# Patient Record
Sex: FEMALE
[De-identification: names, ages, dates, MRNs, and addresses within clinical notes are randomized per-mention and may not be internally consistent; named-entity substitution may affect disease eponyms.]

---

## 2017-05-25 PROCEDURE — 86038 ANTINUCLEAR ANTIBODIES: CPT | Performed by: INTERNAL MEDICINE

## 2017-05-25 PROCEDURE — 86160 COMPLEMENT ANTIGEN: CPT | Performed by: INTERNAL MEDICINE

## 2017-05-25 PROCEDURE — 86225 DNA ANTIBODY NATIVE: CPT | Performed by: INTERNAL MEDICINE

## 2017-05-25 PROCEDURE — 86235 NUCLEAR ANTIGEN ANTIBODY: CPT | Performed by: INTERNAL MEDICINE

## 2017-05-25 PROCEDURE — 83516 IMMUNOASSAY NONANTIBODY: CPT | Performed by: INTERNAL MEDICINE

## 2017-05-25 PROCEDURE — 86200 CCP ANTIBODY: CPT | Performed by: INTERNAL MEDICINE

## 2017-05-30 ENCOUNTER — HOSPITAL (OUTPATIENT)
Dept: OTHER | Age: 62
End: 2017-05-30
Attending: HOSPITALIST

## 2017-05-30 LAB
ALBUMIN SERPL-MCNC: 3.5 GM/DL (ref 3.6–5.1)
ALBUMIN/GLOB SERPL: 1.2 {RATIO} (ref 1–2.4)
ALP SERPL-CCNC: 70 UNIT/L (ref 45–117)
ALT SERPL-CCNC: 25 UNIT/L
AMORPH SED URNS QL MICRO: ABNORMAL
ANALYZER ANC (IANC): NORMAL
ANION GAP SERPL CALC-SCNC: 12 MMOL/L (ref 10–20)
APPEARANCE UR: CLEAR
AST SERPL-CCNC: 20 UNIT/L
BACTERIA #/AREA URNS HPF: ABNORMAL /HPF
BASOPHILS # BLD: 0 THOUSAND/MCL (ref 0–0.3)
BASOPHILS NFR BLD: 1 %
BILIRUB SERPL-MCNC: 0.3 MG/DL (ref 0.2–1)
BILIRUB UR QL: NEGATIVE
BUN SERPL-MCNC: 10 MG/DL (ref 6–20)
BUN/CREAT SERPL: 14 (ref 7–25)
CALCIUM SERPL-MCNC: 8.7 MG/DL (ref 8.4–10.2)
CAOX CRY URNS QL MICRO: ABNORMAL
CHLORIDE: 107 MMOL/L (ref 98–107)
CO2 SERPL-SCNC: 27 MMOL/L (ref 21–32)
COLOR UR: ABNORMAL
CREAT SERPL-MCNC: 0.72 MG/DL (ref 0.51–0.95)
CRP SERPL-MCNC: 0.5 MG/DL
DIFFERENTIAL METHOD BLD: NORMAL
EOSINOPHIL # BLD: 0.1 THOUSAND/MCL (ref 0.1–0.5)
EOSINOPHIL NFR BLD: 1 %
EPITH CASTS #/AREA URNS LPF: ABNORMAL /[LPF]
ERYTHROCYTE [DISTWIDTH] IN BLOOD: 13.2 % (ref 11–15)
ERYTHROCYTE [SEDIMENTATION RATE] IN BLOOD BY WESTERGREN METHOD: 13 MM/HR (ref 0–20)
FATTY CASTS #/AREA URNS LPF: ABNORMAL /[LPF]
GLOBULIN SER-MCNC: 3 GM/DL (ref 2–4)
GLUCOSE BLDC GLUCOMTR-MCNC: 103 MG/DL (ref 65–99)
GLUCOSE SERPL-MCNC: 107 MG/DL (ref 65–99)
GLUCOSE UR-MCNC: NEGATIVE MG/DL
GRAN CASTS #/AREA URNS LPF: ABNORMAL /[LPF]
HEMATOCRIT: 39.6 % (ref 36–46.5)
HGB BLD-MCNC: 13.2 GM/DL (ref 12–15.5)
HGB UR QL: NEGATIVE
HYALINE CASTS #/AREA URNS LPF: ABNORMAL /LPF (ref 0–5)
INR PPP: 2.5
KETONES UR-MCNC: NEGATIVE MG/DL
LEUKOCYTE ESTERASE UR QL STRIP: NEGATIVE
LYMPHOCYTES # BLD: 1.1 THOUSAND/MCL (ref 1–4)
LYMPHOCYTES NFR BLD: 18 %
MAGNESIUM SERPL-MCNC: 1.9 MG/DL (ref 1.7–2.4)
MCH RBC QN AUTO: 29.8 PG (ref 26–34)
MCHC RBC AUTO-ENTMCNC: 33.3 GM/DL (ref 32–36.5)
MCV RBC AUTO: 89.4 FL (ref 78–100)
MIXED CELL CASTS #/AREA URNS LPF: ABNORMAL /[LPF]
MONOCYTES # BLD: 0.3 THOUSAND/MCL (ref 0.3–0.9)
MONOCYTES NFR BLD: 5 %
MUCOUS THREADS URNS QL MICRO: ABNORMAL
NEUTROPHILS # BLD: 4.7 THOUSAND/MCL (ref 1.8–7.7)
NEUTROPHILS NFR BLD: 75 %
NEUTS SEG NFR BLD: NORMAL %
NITRITE UR QL: NEGATIVE
PERCENT NRBC: NORMAL
PH UR: 7 UNIT (ref 5–7)
PLATELET # BLD: 269 THOUSAND/MCL (ref 140–450)
POTASSIUM SERPL-SCNC: 3.8 MMOL/L (ref 3.4–5.1)
PROT SERPL-MCNC: 6.5 GM/DL (ref 6.4–8.2)
PROT UR QL: NEGATIVE MG/DL
PROTHROMBIN TIME: 27.5 SECONDS (ref 9.7–11.8)
PROTHROMBIN TIME: ABNORMAL
RBC # BLD: 4.43 MILLION/MCL (ref 4–5.2)
RBC #/AREA URNS HPF: ABNORMAL /HPF (ref 0–3)
RBC CASTS #/AREA URNS LPF: ABNORMAL /[LPF]
RENAL EPI CELLS #/AREA URNS HPF: ABNORMAL /[HPF]
SODIUM SERPL-SCNC: 142 MMOL/L (ref 135–145)
SP GR UR: <1.005 (ref 1–1.03)
SPECIMEN SOURCE: ABNORMAL
SPERM URNS QL MICRO: ABNORMAL
SQUAMOUS #/AREA URNS HPF: ABNORMAL /HPF (ref 0–5)
T VAGINALIS URNS QL MICRO: ABNORMAL
TRI-PHOS CRY URNS QL MICRO: ABNORMAL
TROPONIN I SERPL HS-MCNC: <0.02 NG/ML
TSH SERPL-ACNC: 1.55 MCUNIT/ML (ref 0.35–5)
URATE CRY URNS QL MICRO: ABNORMAL
URINE REFLEX: ABNORMAL
URNS CMNT MICRO: ABNORMAL
UROBILINOGEN UR QL: 0.2 MG/DL (ref 0–1)
WAXY CASTS #/AREA URNS LPF: ABNORMAL /[LPF]
WBC # BLD: 6.2 THOUSAND/MCL (ref 4.2–11)
WBC #/AREA URNS HPF: ABNORMAL /HPF (ref 0–5)
WBC CASTS #/AREA URNS LPF: ABNORMAL /[LPF]
YEAST HYPHAE URNS QL MICRO: ABNORMAL
YEAST URNS QL MICRO: ABNORMAL

## 2017-05-31 LAB
CHOLEST SERPL-MCNC: 117 MG/DL
CHOLEST/HDLC SERPL: 1.6 {RATIO}
HDLC SERPL-MCNC: 71 MG/DL
INR PPP: 2.3
LDLC SERPL CALC-MCNC: 38 MG/DL
NONHDLC SERPL-MCNC: 46 MG/DL
PROTHROMBIN TIME: 25.2 SECONDS (ref 9.7–11.8)
PROTHROMBIN TIME: ABNORMAL
TRIGLYCERIDE (TRIGP): 38 MG/DL

## 2017-06-01 ENCOUNTER — DIAGNOSTIC TRANS (OUTPATIENT)
Dept: OTHER | Age: 62
End: 2017-06-01

## 2017-06-07 PROBLEM — I63.81 LACUNAR STROKE (HCC): Status: ACTIVE | Noted: 2017-06-07

## 2017-06-07 PROCEDURE — 86618 LYME DISEASE ANTIBODY: CPT | Performed by: INTERNAL MEDICINE

## 2017-06-07 PROCEDURE — 36415 COLL VENOUS BLD VENIPUNCTURE: CPT | Performed by: INTERNAL MEDICINE

## 2017-07-13 PROCEDURE — 86255 FLUORESCENT ANTIBODY SCREEN: CPT | Performed by: OTHER

## 2017-07-13 PROCEDURE — 83876 ASSAY MYELOPEROXIDASE: CPT | Performed by: OTHER

## 2017-07-13 PROCEDURE — 83516 IMMUNOASSAY NONANTIBODY: CPT | Performed by: OTHER

## 2017-07-13 PROCEDURE — 36415 COLL VENOUS BLD VENIPUNCTURE: CPT | Performed by: OTHER

## 2017-08-22 PROCEDURE — 85705 THROMBOPLASTIN INHIBITION: CPT | Performed by: INTERNAL MEDICINE

## 2017-08-22 PROCEDURE — 86146 BETA-2 GLYCOPROTEIN ANTIBODY: CPT | Performed by: INTERNAL MEDICINE

## 2017-08-22 PROCEDURE — 85613 RUSSELL VIPER VENOM DILUTED: CPT | Performed by: INTERNAL MEDICINE

## 2017-08-22 PROCEDURE — 36415 COLL VENOUS BLD VENIPUNCTURE: CPT | Performed by: INTERNAL MEDICINE

## 2017-08-22 PROCEDURE — 85670 THROMBIN TIME PLASMA: CPT | Performed by: INTERNAL MEDICINE

## 2017-08-22 PROCEDURE — 85732 THROMBOPLASTIN TIME PARTIAL: CPT | Performed by: INTERNAL MEDICINE

## 2017-08-22 PROCEDURE — 86147 CARDIOLIPIN ANTIBODY EA IG: CPT | Performed by: INTERNAL MEDICINE

## 2017-10-10 ENCOUNTER — OFFICE VISIT (OUTPATIENT)
Dept: UROLOGY | Facility: HOSPITAL | Age: 62
End: 2017-10-10
Attending: OBSTETRICS & GYNECOLOGY
Payer: COMMERCIAL

## 2017-10-10 VITALS
HEIGHT: 66 IN | BODY MASS INDEX: 36.16 KG/M2 | WEIGHT: 225 LBS | DIASTOLIC BLOOD PRESSURE: 70 MMHG | SYSTOLIC BLOOD PRESSURE: 110 MMHG

## 2017-10-10 DIAGNOSIS — N39.3 FEMALE STRESS INCONTINENCE: ICD-10-CM

## 2017-10-10 DIAGNOSIS — N95.2 POSTMENOPAUSAL ATROPHIC VAGINITIS: ICD-10-CM

## 2017-10-10 DIAGNOSIS — N81.6 RECTOCELE: Primary | ICD-10-CM

## 2017-10-10 DIAGNOSIS — N81.11 CYSTOCELE, MIDLINE: ICD-10-CM

## 2017-10-10 DIAGNOSIS — N81.84 PELVIC MUSCLE WASTING: ICD-10-CM

## 2017-10-10 DIAGNOSIS — N39.41 URGE INCONTINENCE: ICD-10-CM

## 2017-10-10 DIAGNOSIS — N90.4 VULVAR DYSTROPHY: ICD-10-CM

## 2017-10-10 PROCEDURE — 87086 URINE CULTURE/COLONY COUNT: CPT | Performed by: OBSTETRICS & GYNECOLOGY

## 2017-10-10 PROCEDURE — 99201 HC OUTPT EVAL AND MGNT NEW PT LEVEL 1: CPT

## 2017-10-10 PROCEDURE — 81002 URINALYSIS NONAUTO W/O SCOPE: CPT

## 2017-10-10 NOTE — PATIENT INSTRUCTIONS
[address] [address] PELVIC MEDICINE    BOWEL REGIMEN    Constipation can have detrimental effects on bladder function and can worsen the symptoms of prolapse. It is important to avoid constipation.     The first step for treating constipation is to i

## 2017-10-10 NOTE — PROGRESS NOTES
Hitesh Smart DO  10/10/2017     Referred by Dr. Piero Whitmore    Patient presents with:  Prolapse: Referred by Dr Piero Whitmore.  interested in pessary, +constipation cystocele and rectocele    Vag estrace weekly  Interested in pessary, tried #2 & #3 ring with Procedure: MODIFIED BLANCA;  Surgeon:                Anu Florez DPM;  Location: [facility] [address]  No date: HYSTERECTOMY  No date: KNEE REPLACEMENT SURGERY      Comment: bilateral  : OPEN REPAIR LUNATE DISLOCATION Left Standard drinks or equivalent: 1 per week     Comment: social/1 drink once a week.         Allergies:    Fosamax                 Hives    Medications:    Outpatient Medications Prior to Visit:  Sertraline HCl 50 MG Oral Tab Take 1 tablet (50 mg total) by m Summary:  Urogynecology Summary  Prolapse: Yes  SRAVANTHI: Yes  Urge Incontinence: Yes  Nocturia Frequency: 1  Frequency: 2 hours  Constipation: Yes  Wears pad day?: 1  Wears Pad Night?: 1  Activities are limited by UI/POP?: Yes    Review of Systems:    MIMI woodard pharmacologic treatments for OAB  Nonsurgical and surgical treatments for Stress Urinary Incontinence  Nonsurgical and surgical treatments for POP  Pelvic muscle rehabilitation including pelvic floor PT  Topical estrogen therapy for treating UGA  Bowel rou

## 2017-10-13 ENCOUNTER — TELEPHONE (OUTPATIENT)
Dept: UROLOGY | Facility: HOSPITAL | Age: 62
End: 2017-10-13

## 2017-10-19 ENCOUNTER — OFFICE VISIT (OUTPATIENT)
Dept: UROLOGY | Facility: HOSPITAL | Age: 62
End: 2017-10-19
Attending: OBSTETRICS & GYNECOLOGY
Payer: COMMERCIAL

## 2017-10-19 VITALS
DIASTOLIC BLOOD PRESSURE: 74 MMHG | WEIGHT: 225 LBS | HEIGHT: 66 IN | BODY MASS INDEX: 36.16 KG/M2 | SYSTOLIC BLOOD PRESSURE: 112 MMHG

## 2017-10-19 DIAGNOSIS — N39.3 FEMALE STRESS INCONTINENCE: ICD-10-CM

## 2017-10-19 DIAGNOSIS — N81.11 CYSTOCELE, MIDLINE: ICD-10-CM

## 2017-10-19 DIAGNOSIS — N39.41 URGE INCONTINENCE: ICD-10-CM

## 2017-10-19 DIAGNOSIS — N81.6 RECTOCELE: Primary | ICD-10-CM

## 2017-10-19 PROCEDURE — 51798 US URINE CAPACITY MEASURE: CPT

## 2017-10-19 PROCEDURE — 99211 OFF/OP EST MAY X REQ PHY/QHP: CPT

## 2017-10-19 RX ORDER — CLOBETASOL PROPIONATE 0.5 MG/G
1 CREAM TOPICAL 2 TIMES DAILY PRN
Qty: 1 TUBE | Refills: 3 | Status: SHIPPED | OUTPATIENT
Start: 2017-10-19

## 2017-10-19 NOTE — PROCEDURES
Patient here for pessary check, patient having leakage, denies vaginal spotting/discharge. Patient unhappy with pessary. Cube pessary removed,cleaned and put in a bag to take home.   Speculum exam revealed pink moist tissue, no open areas or lesions noted

## 2018-01-19 PROCEDURE — 86160 COMPLEMENT ANTIGEN: CPT | Performed by: INTERNAL MEDICINE

## 2018-02-21 ENCOUNTER — OFFICE VISIT (OUTPATIENT)
Dept: UROLOGY | Facility: HOSPITAL | Age: 63
End: 2018-02-21
Attending: OBSTETRICS & GYNECOLOGY
Payer: COMMERCIAL

## 2018-02-21 VITALS
HEIGHT: 67 IN | WEIGHT: 230 LBS | BODY MASS INDEX: 36.1 KG/M2 | DIASTOLIC BLOOD PRESSURE: 82 MMHG | SYSTOLIC BLOOD PRESSURE: 122 MMHG

## 2018-02-21 DIAGNOSIS — N39.3 FEMALE STRESS INCONTINENCE: ICD-10-CM

## 2018-02-21 DIAGNOSIS — N39.41 URGE INCONTINENCE: ICD-10-CM

## 2018-02-21 DIAGNOSIS — N81.11 CYSTOCELE, MIDLINE: ICD-10-CM

## 2018-02-21 DIAGNOSIS — N81.6 RECTOCELE: Primary | ICD-10-CM

## 2018-02-21 LAB
BLOOD URINE: NEGATIVE
CONTROL RUN WITHIN 24 HOURS?: YES
LEUKOCYTE ESTERASE URINE: NEGATIVE
NITRITE URINE: NEGATIVE

## 2018-02-21 PROCEDURE — 81002 URINALYSIS NONAUTO W/O SCOPE: CPT

## 2018-02-21 PROCEDURE — 51700 IRRIGATION OF BLADDER: CPT

## 2018-02-21 NOTE — PATIENT INSTRUCTIONS
[facility]  [address] [place] [address] 67, [address]su  Office: [phone]      Urodynamic Testing Discharge Instructions: There are NO dietary or activity restrictions. You may resume your normal schedule.       You may hav

## 2018-02-21 NOTE — PROGRESS NOTES
..Patient here for urodynamic testing. Procedure explained and confirmed by patient. See evaluation form for results. Both verbal and written discharge instructions were given.   Patient tolerated procedure well and will follow up with Dr. Prem Vasquez

## 2018-02-21 NOTE — PROCEDURES
URODYNAMIC EVALUATION  PATIENT HISTORY:  Pt with a H/O hyst, has tried a pessary in the past for her prolase without success--could not find one to fit properly, c/o leaking with activity and on the way to the bathroom  Surgery?   [x]  No  []  Yes, specify Erythrocyte Sedimentatio* 01/19/2018 8    • C-Reactive Protein 01/19/2018 0.27    • Complement C3 01/19/2018 126.0    • Complement C4 01/19/2018 23.5    • WBC 01/19/2018 5.54    • RBC 01/19/2018 4.20    • Hemoglobin 01/19/2018 13.0    • Hematocrit 01/19/20 450 mL  Bladder Compliance:   129 mL/cm water  Detrusor Activity:  []  Unstable   [x]  Stable  Urge leakage?     []  Yes [x]  No  Volume at 1st unhibited detrusor cont:   n/a mL  Detrusor instability provoked by:    []  Spontaneous []  Coughing  []  Weston Melgar

## 2018-02-27 ENCOUNTER — OFFICE VISIT (OUTPATIENT)
Dept: UROLOGY | Facility: HOSPITAL | Age: 63
End: 2018-02-27
Attending: OBSTETRICS & GYNECOLOGY
Payer: COMMERCIAL

## 2018-02-27 VITALS
SYSTOLIC BLOOD PRESSURE: 122 MMHG | DIASTOLIC BLOOD PRESSURE: 82 MMHG | HEIGHT: 67 IN | BODY MASS INDEX: 36.1 KG/M2 | WEIGHT: 230 LBS

## 2018-02-27 DIAGNOSIS — N39.41 URGE INCONTINENCE: ICD-10-CM

## 2018-02-27 DIAGNOSIS — N39.3 FEMALE STRESS INCONTINENCE: ICD-10-CM

## 2018-02-27 DIAGNOSIS — N81.6 RECTOCELE: ICD-10-CM

## 2018-02-27 DIAGNOSIS — N81.11 CYSTOCELE, MIDLINE: Primary | ICD-10-CM

## 2018-02-27 PROCEDURE — 99211 OFF/OP EST MAY X REQ PHY/QHP: CPT

## 2018-02-27 NOTE — PROGRESS NOTES
Pt presents w/ initial c/o bulge  Urodynamic testing undergone without complication.   Results reviewed with patient  224/30cc & 526/4cc  No DO  + SRAVANTHI @300cc reduced  skilled nursing 450cc    Discussed with patient mgmt options for POP, SRAVANTHI (constipation)  Tried multip

## 2018-02-27 NOTE — PATIENT INSTRUCTIONS
[id number] [address] PELVIC MEDICINE    BOWEL REGIMEN    Constipation can have detrimental effects on bladder function and can worsen the symptoms of prolapse. It is important to avoid constipation.     The first step for treating constipation is to i ride in a car immediately. · You may drive after 1-2 weeks.     Please call us for any of the following:  · Temperature above 100.5 for 4 hours or above 101.0 at any time  · Chest pain or trouble breathing  · Vaginal bleeding heavier than a period  · Redne

## 2018-03-14 ENCOUNTER — ANESTHESIA EVENT (OUTPATIENT)
Dept: SURGERY | Facility: HOSPITAL | Age: 63
End: 2018-03-14

## 2018-03-14 NOTE — H&P
56 y/o female with cystocele, rectocele, SRAVANTHI for surgical mgmt  Pre operative clearance with Dr John Montalvo, pt seen & examined without changes.   Thorough discussion of surgical risks, benefits, and alternatives including, but not limited to bleeding/clots, inf

## 2018-03-14 NOTE — ANESTHESIA PREPROCEDURE EVALUATION
PRE-OP EVALUATION    Patient Name: Toshia Llamas    Pre-op Diagnosis: CYSTOCELE, RECTOCELE, STRESS INCONTINENCE    Procedure(s):  ANTERIOR POSTERIOR REPAIR, PLACEMENT OF MIDURETHRAL SLING, CYSTOSCOPY    Surgeon(s) and Role:     * Toney Del Castillo DO - P total) by mouth daily with food. Disp: 90 tablet Rfl: 3   Clobetasol Propionate 0.05 % External Cream Apply 1 Application topically 2 (two) times daily as needed.  (Patient taking differently: Apply 1 Application topically twice a week.  ) Disp: 1 Tube Rfl: (posterior vitreous detachment), bilateral  Undifferentiated connective tissue disease ([address] DVT, recurrent, lower extremity, chronic, unspecified laterality ([address]  Other pulmonary embolism without acute cor pulmonale, unspecified chronicity ([address] S/P rota within  [facility] limits.   Mora Rangel MD, [facility]  02/17/2017 12:31    ECG: NSR, normal      Past Surgical History:  No date: COLONOSCOPY      Comment: 2005; hemorrhoids  8/8/2013: COLONOSCOPY      Comment: Procedure: COLONOSCOPY;  Surgeon: Dejah Snowden Smokeless tobacco: Never Used    Alcohol use Yes    Comment: social       Drug use: No     Available pre-op labs reviewed.     Lab Results  Component Value Date   WBC 5.54 01/19/2018   RBC 4.20 01/19/2018   HGB 13.0 01/19/2018   HCT 38.4 01/19/2018   MCV 91 spouse                Present on Admission:  **None**

## 2018-03-15 ENCOUNTER — SURGERY (OUTPATIENT)
Age: 63
End: 2018-03-15

## 2018-03-15 ENCOUNTER — ANESTHESIA (OUTPATIENT)
Dept: SURGERY | Facility: HOSPITAL | Age: 63
End: 2018-03-15

## 2018-03-15 ENCOUNTER — HOSPITAL ENCOUNTER (OUTPATIENT)
Facility: HOSPITAL | Age: 63
Setting detail: HOSPITAL OUTPATIENT SURGERY
Discharge: HOME | End: 2018-03-15
Attending: OBSTETRICS & GYNECOLOGY | Admitting: OBSTETRICS & GYNECOLOGY
Payer: COMMERCIAL

## 2018-03-15 VITALS
WEIGHT: 225.31 LBS | TEMPERATURE: 98 F | BODY MASS INDEX: 35.36 KG/M2 | DIASTOLIC BLOOD PRESSURE: 73 MMHG | OXYGEN SATURATION: 100 % | HEART RATE: 51 BPM | SYSTOLIC BLOOD PRESSURE: 118 MMHG | RESPIRATION RATE: 20 BRPM | HEIGHT: 67 IN

## 2018-03-15 PROCEDURE — 0JQC0ZZ REPAIR PELVIC REGION SUBCUTANEOUS TISSUE AND FASCIA, OPEN APPROACH: ICD-10-PCS | Performed by: OBSTETRICS & GYNECOLOGY

## 2018-03-15 PROCEDURE — 0TSD0ZZ REPOSITION URETHRA, OPEN APPROACH: ICD-10-PCS | Performed by: OBSTETRICS & GYNECOLOGY

## 2018-03-15 PROCEDURE — 0WQNXZZ REPAIR FEMALE PERINEUM, EXTERNAL APPROACH: ICD-10-PCS | Performed by: OBSTETRICS & GYNECOLOGY

## 2018-03-15 DEVICE — TRANSVAGINAL MID-URETHRAL SLING
Type: IMPLANTABLE DEVICE | Site: VAGINA | Status: FUNCTIONAL
Brand: ADVANTAGE FIT™  SYSTEM

## 2018-03-15 RX ORDER — NALOXONE HYDROCHLORIDE 0.4 MG/ML
80 INJECTION, SOLUTION INTRAMUSCULAR; INTRAVENOUS; SUBCUTANEOUS AS NEEDED
Status: DISCONTINUED | OUTPATIENT
Start: 2018-03-15 | End: 2018-03-15

## 2018-03-15 RX ORDER — HYDROCODONE BITARTRATE AND ACETAMINOPHEN 5; 325 MG/1; MG/1
1 TABLET ORAL EVERY 6 HOURS PRN
Status: DISCONTINUED | OUTPATIENT
Start: 2018-03-15 | End: 2018-03-15

## 2018-03-15 RX ORDER — ONDANSETRON 2 MG/ML
4 INJECTION INTRAMUSCULAR; INTRAVENOUS AS NEEDED
Status: DISCONTINUED | OUTPATIENT
Start: 2018-03-15 | End: 2018-03-15

## 2018-03-15 RX ORDER — HYDROCODONE BITARTRATE AND ACETAMINOPHEN 5; 325 MG/1; MG/1
1 TABLET ORAL EVERY 6 HOURS PRN
Qty: 12 TABLET | Refills: 0 | Status: SHIPPED | OUTPATIENT
Start: 2018-03-15 | End: 2018-03-30

## 2018-03-15 RX ORDER — ONDANSETRON 2 MG/ML
INJECTION INTRAMUSCULAR; INTRAVENOUS
Status: COMPLETED
Start: 2018-03-15 | End: 2018-03-15

## 2018-03-15 RX ORDER — HYDROCODONE BITARTRATE AND ACETAMINOPHEN 5; 325 MG/1; MG/1
2 TABLET ORAL AS NEEDED
Status: DISCONTINUED | OUTPATIENT
Start: 2018-03-15 | End: 2018-03-15

## 2018-03-15 RX ORDER — HYDROCODONE BITARTRATE AND ACETAMINOPHEN 5; 325 MG/1; MG/1
1 TABLET ORAL AS NEEDED
Status: DISCONTINUED | OUTPATIENT
Start: 2018-03-15 | End: 2018-03-15

## 2018-03-15 RX ORDER — CEFAZOLIN SODIUM 1 G/3ML
INJECTION, POWDER, FOR SOLUTION INTRAMUSCULAR; INTRAVENOUS
Status: DISCONTINUED | OUTPATIENT
Start: 2018-03-15 | End: 2018-03-15

## 2018-03-15 RX ORDER — CEFAZOLIN SODIUM/WATER 2 G/20 ML
SYRINGE (ML) INTRAVENOUS
Status: DISCONTINUED
Start: 2018-03-15 | End: 2018-03-15

## 2018-03-15 RX ORDER — METOCLOPRAMIDE HYDROCHLORIDE 5 MG/ML
10 INJECTION INTRAMUSCULAR; INTRAVENOUS AS NEEDED
Status: DISCONTINUED | OUTPATIENT
Start: 2018-03-15 | End: 2018-03-15

## 2018-03-15 RX ORDER — IBUPROFEN 200 MG
600 TABLET ORAL EVERY 6 HOURS PRN
Status: DISCONTINUED | OUTPATIENT
Start: 2018-03-15 | End: 2018-03-15

## 2018-03-15 RX ORDER — CEFAZOLIN SODIUM/WATER 2 G/20 ML
2 SYRINGE (ML) INTRAVENOUS ONCE
Status: DISCONTINUED | OUTPATIENT
Start: 2018-03-15 | End: 2018-03-15 | Stop reason: HOSPADM

## 2018-03-15 RX ORDER — MORPHINE SULFATE 2 MG/ML
2 INJECTION, SOLUTION INTRAMUSCULAR; INTRAVENOUS EVERY 5 MIN PRN
Status: DISCONTINUED | OUTPATIENT
Start: 2018-03-15 | End: 2018-03-15

## 2018-03-15 RX ORDER — MEPERIDINE HYDROCHLORIDE 25 MG/ML
12.5 INJECTION INTRAMUSCULAR; INTRAVENOUS; SUBCUTANEOUS AS NEEDED
Status: DISCONTINUED | OUTPATIENT
Start: 2018-03-15 | End: 2018-03-15

## 2018-03-15 RX ORDER — IBUPROFEN 600 MG/1
600 TABLET ORAL EVERY 6 HOURS PRN
Qty: 30 TABLET | Refills: 1 | Status: SHIPPED | OUTPATIENT
Start: 2018-03-15 | End: 2018-03-30

## 2018-03-15 RX ORDER — BUPIVACAINE HYDROCHLORIDE AND EPINEPHRINE 2.5; 5 MG/ML; UG/ML
INJECTION, SOLUTION EPIDURAL; INFILTRATION; INTRACAUDAL; PERINEURAL AS NEEDED
Status: DISCONTINUED | OUTPATIENT
Start: 2018-03-15 | End: 2018-03-15 | Stop reason: HOSPADM

## 2018-03-15 RX ORDER — SODIUM CHLORIDE, SODIUM LACTATE, POTASSIUM CHLORIDE, CALCIUM CHLORIDE 600; 310; 30; 20 MG/100ML; MG/100ML; MG/100ML; MG/100ML
INJECTION, SOLUTION INTRAVENOUS CONTINUOUS
Status: DISCONTINUED | OUTPATIENT
Start: 2018-03-15 | End: 2018-03-15

## 2018-03-15 NOTE — ANESTHESIA POSTPROCEDURE EVALUATION
[address] [facility] Patient Status:  Hospital Outpatient Surgery   Age/Gender 57year old female MRN [id number]   Location [facility] Attending Danilo Neal,    Hosp Day # 0 PCP Bernardo Kohler MD       A

## 2018-03-15 NOTE — OPERATIVE REPORT
Pre Op: cystocele, rectocele, stress urinary incontinence  Post op: same    Procedure: anterior repair, posterior colpoperineorrhaphy, midurethral sling, cystoscopy  Surgeon: Leopoldo Bernal DO  Assist: ROBERT Severino  EBL: 100cc  UOP [id number]   No complications  Fin sling were marked on the mons pubis, and the Advantage Fit trocars were then used to place the guide sleeves into the retropubic space.   Once the sleeves were in position, cystoscopy was performed confirming there had been no injury to the bladder with mckayla

## 2018-03-16 ENCOUNTER — TELEPHONE (OUTPATIENT)
Dept: UROLOGY | Facility: HOSPITAL | Age: 63
End: 2018-03-16

## 2018-03-16 NOTE — TELEPHONE ENCOUNTER
TC to pt following surgery  LM  Encouraged her to call with questions or concerns  Follow up as scheduled

## 2018-03-30 ENCOUNTER — OFFICE VISIT (OUTPATIENT)
Dept: UROLOGY | Facility: HOSPITAL | Age: 63
End: 2018-03-30
Attending: OBSTETRICS & GYNECOLOGY
Payer: COMMERCIAL

## 2018-03-30 VITALS
WEIGHT: 225 LBS | DIASTOLIC BLOOD PRESSURE: 84 MMHG | BODY MASS INDEX: 35.73 KG/M2 | HEIGHT: 66.5 IN | SYSTOLIC BLOOD PRESSURE: 128 MMHG

## 2018-03-30 DIAGNOSIS — Z98.890 POST-OPERATIVE STATE: Primary | ICD-10-CM

## 2018-03-30 PROCEDURE — 99211 OFF/OP EST MAY X REQ PHY/QHP: CPT

## 2018-03-30 NOTE — PROGRESS NOTES
She is s/p Post-Op Summary  Procedure Date: 03/15/18  Procedure Name: Anterior and Posterior Repair;Cystoscopy;Prolene Mesh Mid Urethral Sling  Post-Op Symptoms: Bleeding  Do you feel your surgery was successful?: Very successful  Compared to before surger

## 2018-04-12 ENCOUNTER — TELEPHONE (OUTPATIENT)
Dept: UROLOGY | Facility: HOSPITAL | Age: 63
End: 2018-04-12

## 2018-04-12 NOTE — TELEPHONE ENCOUNTER
Pt called with condition update, states fell at home on Tuesday, went to [facility], was admitted, wearing a cervical collar, had 18 stitches in her forehead, is taking Norco again, had increased constipation again, also has noticed she has had

## 2018-04-26 PROBLEM — S19.9XXA HEADACHE DUE TO INJURY OF HEAD AND NECK: Status: ACTIVE | Noted: 2018-04-26

## 2018-04-26 PROBLEM — M54.2 NECK PAIN, ACUTE: Status: ACTIVE | Noted: 2018-04-26

## 2018-04-26 PROBLEM — S09.90XA HEADACHE DUE TO INJURY OF HEAD AND NECK: Status: ACTIVE | Noted: 2018-04-26

## 2018-05-21 PROCEDURE — 87086 URINE CULTURE/COLONY COUNT: CPT | Performed by: INTERNAL MEDICINE

## 2018-05-21 PROCEDURE — 86160 COMPLEMENT ANTIGEN: CPT | Performed by: INTERNAL MEDICINE

## 2018-05-21 PROCEDURE — 86225 DNA ANTIBODY NATIVE: CPT | Performed by: INTERNAL MEDICINE

## 2018-05-21 PROCEDURE — 81001 URINALYSIS AUTO W/SCOPE: CPT | Performed by: INTERNAL MEDICINE

## 2018-06-12 ENCOUNTER — OFFICE VISIT (OUTPATIENT)
Dept: UROLOGY | Facility: HOSPITAL | Age: 63
End: 2018-06-12
Attending: OBSTETRICS & GYNECOLOGY
Payer: COMMERCIAL

## 2018-06-12 VITALS
WEIGHT: 231 LBS | SYSTOLIC BLOOD PRESSURE: 118 MMHG | HEIGHT: 67 IN | DIASTOLIC BLOOD PRESSURE: 82 MMHG | BODY MASS INDEX: 36.26 KG/M2

## 2018-06-12 DIAGNOSIS — N95.2 POSTMENOPAUSAL ATROPHIC VAGINITIS: ICD-10-CM

## 2018-06-12 DIAGNOSIS — Z98.890 POST-OPERATIVE STATE: ICD-10-CM

## 2018-06-12 DIAGNOSIS — N81.84 PELVIC MUSCLE WASTING: Primary | ICD-10-CM

## 2018-06-12 PROCEDURE — 99211 OFF/OP EST MAY X REQ PHY/QHP: CPT

## 2018-06-12 NOTE — PATIENT INSTRUCTIONS
Vaginal estrogen cream twice weekly    [address] MEDICINE    PELVIC MUSCLE EXERCISES [facility] [place])    The muscles that surround the vagina help to support the pelvic organs and maintain bladder and bowel control are called the “pelvic fl The goal is to maintain constant effort in an active squeeze in order to strengthen the muscles. It is better to maintain a strong active squeeze for a short period of time that to flick the muscle on and off for any period of time.   You will need to work

## 2018-06-12 NOTE — PROGRESS NOTES
She is s/p Post-Op Summary  Procedure Date: 03/15/18  Procedure Name: Anterior and Posterior Repair;Prolene Mesh Mid Urethral Sling;Cystoscopy  Post-Op Symptoms: Patient denies pain, SRAVANTHI, UUI, prolapse symptoms, nausea/vomitting, fevers/chills, bleeding, v

## 2018-08-29 PROBLEM — N90.4 LICHEN SCLEROSUS ET ATROPHICUS OF THE VULVA: Status: ACTIVE | Noted: 2018-08-29

## 2018-10-24 PROCEDURE — 83516 IMMUNOASSAY NONANTIBODY: CPT | Performed by: INTERNAL MEDICINE

## 2018-10-24 PROCEDURE — 86160 COMPLEMENT ANTIGEN: CPT | Performed by: INTERNAL MEDICINE

## 2018-10-24 PROCEDURE — 81001 URINALYSIS AUTO W/SCOPE: CPT | Performed by: INTERNAL MEDICINE

## 2018-10-24 PROCEDURE — 86225 DNA ANTIBODY NATIVE: CPT | Performed by: INTERNAL MEDICINE

## 2018-10-24 PROCEDURE — 86038 ANTINUCLEAR ANTIBODIES: CPT | Performed by: INTERNAL MEDICINE

## 2019-03-04 PROCEDURE — 86225 DNA ANTIBODY NATIVE: CPT | Performed by: INTERNAL MEDICINE

## 2019-03-04 PROCEDURE — 81001 URINALYSIS AUTO W/SCOPE: CPT | Performed by: INTERNAL MEDICINE

## 2019-03-04 PROCEDURE — 86160 COMPLEMENT ANTIGEN: CPT | Performed by: INTERNAL MEDICINE

## 2019-03-07 PROBLEM — G44.86 HEADACHE, CERVICOGENIC: Status: ACTIVE | Noted: 2019-03-07

## 2019-03-19 ENCOUNTER — OFFICE VISIT (OUTPATIENT)
Dept: UROLOGY | Facility: HOSPITAL | Age: 64
End: 2019-03-19
Attending: OBSTETRICS & GYNECOLOGY
Payer: MEDICARE

## 2019-03-19 VITALS
BODY MASS INDEX: 35.03 KG/M2 | DIASTOLIC BLOOD PRESSURE: 78 MMHG | WEIGHT: 218 LBS | SYSTOLIC BLOOD PRESSURE: 118 MMHG | HEIGHT: 66 IN

## 2019-03-19 DIAGNOSIS — Z98.890 POST-OPERATIVE STATE: ICD-10-CM

## 2019-03-19 DIAGNOSIS — N81.84 PELVIC MUSCLE WASTING: ICD-10-CM

## 2019-03-19 DIAGNOSIS — R15.9 INCONTINENCE OF FECES WITH FECAL URGENCY: ICD-10-CM

## 2019-03-19 DIAGNOSIS — R15.2 INCONTINENCE OF FECES WITH FECAL URGENCY: ICD-10-CM

## 2019-03-19 DIAGNOSIS — N95.2 POSTMENOPAUSAL ATROPHIC VAGINITIS: Primary | ICD-10-CM

## 2019-03-19 DIAGNOSIS — N90.4 VULVAR DYSTROPHY: ICD-10-CM

## 2019-03-19 PROCEDURE — 17250 CHEM CAUT OF GRANLTJ TISSUE: CPT

## 2019-03-19 PROCEDURE — 99211 OFF/OP EST MAY X REQ PHY/QHP: CPT

## 2019-03-19 RX ORDER — ESTRADIOL 0.1 MG/G
CREAM VAGINAL DAILY PRN
COMMUNITY

## 2019-03-19 NOTE — PATIENT INSTRUCTIONS
Vag estrogen twice weekly  [facility] FOR PELVIC MEDICINE    Fingertip Application Method for Estrogen Vaginal Cream    1. Wash you hands with soap and water and dry thoroughly.     2.  Squeeze out enough cream from the tube to _____Milk of Magnesia    Begin with 1 teaspoon every evening. This is a very low dose---approximately one-sixth of the typical dose. You may need to increase the amount depending on your results.       XX Miralax - as needed (but regularly)    Miralax is In order to determine if you are santa effectively, it is helpful to place a finger in the vagina intermittently in order to note your progress and determine if you are satna the correct muscles.   Although a proper Kegel contraction can stop th The benefits include improving pelvic floor strength and tone. This results in a better ability to hold your urine, your bowels and for those who are sexually active, there may be improvement in ability to feel pleasure.

## 2019-03-19 NOTE — PROGRESS NOTES
She is s/p Post-Op Summary  Procedure Date: 03/15/18  Procedure Name: Anterior and Posterior Repair;Prolene Mesh Mid Urethral Sling;Cystoscopy  Post-Op Symptoms: Prolapse symptoms;Bleeding  Do you feel your surgery was successful?: Somewhat successful  Com

## 2019-08-05 PROCEDURE — 86160 COMPLEMENT ANTIGEN: CPT | Performed by: INTERNAL MEDICINE

## 2019-08-27 PROCEDURE — 87086 URINE CULTURE/COLONY COUNT: CPT | Performed by: EMERGENCY MEDICINE

## 2019-08-27 PROCEDURE — 87186 SC STD MICRODIL/AGAR DIL: CPT | Performed by: EMERGENCY MEDICINE

## 2019-08-27 PROCEDURE — 87088 URINE BACTERIA CULTURE: CPT | Performed by: EMERGENCY MEDICINE

## 2019-12-31 ENCOUNTER — APPOINTMENT (OUTPATIENT)
Dept: CT IMAGING | Age: 64
End: 2019-12-31
Attending: EMERGENCY MEDICINE
Payer: MEDICARE

## 2019-12-31 ENCOUNTER — HOSPITAL ENCOUNTER (EMERGENCY)
Age: 64
Discharge: HOME | End: 2019-12-31
Attending: EMERGENCY MEDICINE
Payer: MEDICARE

## 2019-12-31 VITALS
WEIGHT: 206 LBS | HEART RATE: 53 BPM | SYSTOLIC BLOOD PRESSURE: 112 MMHG | RESPIRATION RATE: 16 BRPM | HEIGHT: 67 IN | DIASTOLIC BLOOD PRESSURE: 64 MMHG | BODY MASS INDEX: 32.33 KG/M2 | TEMPERATURE: 98 F | OXYGEN SATURATION: 97 %

## 2019-12-31 DIAGNOSIS — R09.1 PLEURISY: Primary | ICD-10-CM

## 2019-12-31 LAB
ALBUMIN SERPL-MCNC: 3.5 G/DL (ref 3.4–5)
ALBUMIN/GLOB SERPL: 1.2 {RATIO} (ref 1–2)
ALP LIVER SERPL-CCNC: 78 U/L (ref 55–142)
ALT SERPL-CCNC: 23 U/L (ref 13–56)
ANION GAP SERPL CALC-SCNC: 7 MMOL/L (ref 0–18)
AST SERPL-CCNC: 15 U/L (ref 15–37)
BASOPHILS # BLD AUTO: 0.06 X10(3) UL (ref 0–0.2)
BASOPHILS NFR BLD AUTO: 1.1 %
BILIRUB SERPL-MCNC: 0.4 MG/DL (ref 0.1–2)
BUN BLD-MCNC: 11 MG/DL (ref 7–18)
BUN/CREAT SERPL: 16.7 (ref 10–20)
CALCIUM BLD-MCNC: 8.3 MG/DL (ref 8.5–10.1)
CHLORIDE SERPL-SCNC: 107 MMOL/L (ref 98–112)
CO2 SERPL-SCNC: 25 MMOL/L (ref 21–32)
CREAT BLD-MCNC: 0.66 MG/DL (ref 0.55–1.02)
DEPRECATED RDW RBC AUTO: 43.6 FL (ref 35.1–46.3)
EOSINOPHIL # BLD AUTO: 0.22 X10(3) UL (ref 0–0.7)
EOSINOPHIL NFR BLD AUTO: 3.9 %
ERYTHROCYTE [DISTWIDTH] IN BLOOD BY AUTOMATED COUNT: 12.9 % (ref 11–15)
GLOBULIN PLAS-MCNC: 3 G/DL (ref 2.8–4.4)
GLUCOSE BLD-MCNC: 81 MG/DL (ref 70–99)
HCT VFR BLD AUTO: 38.9 % (ref 35–48)
HGB BLD-MCNC: 12.8 G/DL (ref 12–16)
IMM GRANULOCYTES # BLD AUTO: 0.02 X10(3) UL (ref 0–1)
IMM GRANULOCYTES NFR BLD: 0.4 %
LYMPHOCYTES # BLD AUTO: 1.74 X10(3) UL (ref 1–4)
LYMPHOCYTES NFR BLD AUTO: 30.7 %
M PROTEIN MFR SERPL ELPH: 6.5 G/DL (ref 6.4–8.2)
MCH RBC QN AUTO: 30.2 PG (ref 26–34)
MCHC RBC AUTO-ENTMCNC: 32.9 G/DL (ref 31–37)
MCV RBC AUTO: 91.7 FL (ref 80–100)
MONOCYTES # BLD AUTO: 0.39 X10(3) UL (ref 0.1–1)
MONOCYTES NFR BLD AUTO: 6.9 %
NEUTROPHILS # BLD AUTO: 3.24 X10 (3) UL (ref 1.5–7.7)
NEUTROPHILS # BLD AUTO: 3.24 X10(3) UL (ref 1.5–7.7)
NEUTROPHILS NFR BLD AUTO: 57 %
OSMOLALITY SERPL CALC.SUM OF ELEC: 286 MOSM/KG (ref 275–295)
PLATELET # BLD AUTO: 230 10(3)UL (ref 150–450)
POTASSIUM SERPL-SCNC: 4 MMOL/L (ref 3.5–5.1)
RBC # BLD AUTO: 4.24 X10(6)UL (ref 3.8–5.3)
SODIUM SERPL-SCNC: 139 MMOL/L (ref 136–145)
TROPONIN I SERPL-MCNC: <0.045 NG/ML (ref ?–0.04)
WBC # BLD AUTO: 5.7 X10(3) UL (ref 4–11)

## 2019-12-31 PROCEDURE — 93005 ELECTROCARDIOGRAM TRACING: CPT

## 2019-12-31 PROCEDURE — 80053 COMPREHEN METABOLIC PANEL: CPT | Performed by: EMERGENCY MEDICINE

## 2019-12-31 PROCEDURE — 85025 COMPLETE CBC W/AUTO DIFF WBC: CPT | Performed by: EMERGENCY MEDICINE

## 2019-12-31 PROCEDURE — 99285 EMERGENCY DEPT VISIT HI MDM: CPT

## 2019-12-31 PROCEDURE — 93010 ELECTROCARDIOGRAM REPORT: CPT

## 2019-12-31 PROCEDURE — 71275 CT ANGIOGRAPHY CHEST: CPT | Performed by: EMERGENCY MEDICINE

## 2019-12-31 PROCEDURE — 96374 THER/PROPH/DIAG INJ IV PUSH: CPT

## 2019-12-31 PROCEDURE — 84484 ASSAY OF TROPONIN QUANT: CPT | Performed by: EMERGENCY MEDICINE

## 2019-12-31 RX ORDER — KETOROLAC TROMETHAMINE 30 MG/ML
30 INJECTION, SOLUTION INTRAMUSCULAR; INTRAVENOUS ONCE
Status: COMPLETED | OUTPATIENT
Start: 2019-12-31 | End: 2019-12-31

## 2019-12-31 NOTE — ED INITIAL ASSESSMENT (HPI)
Right scapular pain x 3 days. Missed couple doses of xeralto. History of PE. Pain worse with deep breath.

## 2019-12-31 NOTE — ED PROVIDER NOTES
Patient Seen in: [facility] Emergency Department In [place]      History   Patient presents with:  Back Pain  Dyspnea DIPAK SOB    Stated Complaint: right scapular pain into rib cage, has hx of PE missed some doses of xarlto, ca*    HPI    64-year-old femal MAIN OR   • COLONOSCOPY      2005; hemorrhoids   • COLONOSCOPY N/A 8/8/2013    Performed by Hudson Alvarez MD at [facility] ENDOSCOPY   • ESOPHAGOGASTRODUODENOSCOPY, POSSIBLE BIOPSY, POSSIBLE POLYPECTOMY 87247 N/A 3/25/2015    Performed by Hudson Alvarez MD at [address] (36.4 °C)   Temp src    SpO2 96 %   O2 Device None (Room air)       Current:/64   Pulse 53   Temp 97.5 °F (36.4 °C)   Resp 16   Ht 170.2 cm (5' 7\")   Wt 93.4 kg   LMP  (LMP Unknown)   SpO2 97%   BMI 32.26 kg/m²         Physical Exam    General: Elmer to the [facility]) Johanny Arreaga [address] ([address]) which includes the Dose Index Registry.   PATIENT STATED HISTORY:(As transcribed by Technologist)  The patient states she is having right scapular pain and shortness of breat the setting of a viral URI and does not require antibiotics          Disposition and Plan     Clinical Impression:  Pleurisy  (primary encounter diagnosis)    Disposition:  There is no disposition on file for this visit.   There is no disposition time on fi

## 2020-01-02 LAB
ATRIAL RATE: 63 BPM
P AXIS: 63 DEGREES
P-R INTERVAL: 186 MS
Q-T INTERVAL: 446 MS
QRS DURATION: 90 MS
QTC CALCULATION (BEZET): 456 MS
R AXIS: -41 DEGREES
T AXIS: 27 DEGREES
VENTRICULAR RATE: 63 BPM

## 2020-02-12 PROBLEM — R42 VERTIGO: Status: ACTIVE | Noted: 2020-02-12

## 2020-05-19 ENCOUNTER — APPOINTMENT (OUTPATIENT)
Dept: MRI IMAGING | Facility: HOSPITAL | Age: 65
End: 2020-05-19
Attending: EMERGENCY MEDICINE
Payer: MEDICARE

## 2020-05-19 ENCOUNTER — HOSPITAL ENCOUNTER (EMERGENCY)
Facility: HOSPITAL | Age: 65
Discharge: HOME | End: 2020-05-19
Attending: EMERGENCY MEDICINE
Payer: MEDICARE

## 2020-05-19 VITALS
OXYGEN SATURATION: 97 % | BODY MASS INDEX: 34 KG/M2 | HEART RATE: 64 BPM | WEIGHT: 210.13 LBS | SYSTOLIC BLOOD PRESSURE: 121 MMHG | RESPIRATION RATE: 11 BRPM | TEMPERATURE: 99 F | DIASTOLIC BLOOD PRESSURE: 66 MMHG

## 2020-05-19 DIAGNOSIS — G45.9 TIA (TRANSIENT ISCHEMIC ATTACK): Primary | ICD-10-CM

## 2020-05-19 DIAGNOSIS — H81.10 BENIGN PAROXYSMAL POSITIONAL VERTIGO, UNSPECIFIED LATERALITY: ICD-10-CM

## 2020-05-19 PROCEDURE — 80053 COMPREHEN METABOLIC PANEL: CPT | Performed by: EMERGENCY MEDICINE

## 2020-05-19 PROCEDURE — 82962 GLUCOSE BLOOD TEST: CPT

## 2020-05-19 PROCEDURE — 85025 COMPLETE CBC W/AUTO DIFF WBC: CPT | Performed by: EMERGENCY MEDICINE

## 2020-05-19 PROCEDURE — 85730 THROMBOPLASTIN TIME PARTIAL: CPT | Performed by: EMERGENCY MEDICINE

## 2020-05-19 PROCEDURE — 99285 EMERGENCY DEPT VISIT HI MDM: CPT

## 2020-05-19 PROCEDURE — 70546 MR ANGIOGRAPH HEAD W/O&W/DYE: CPT | Performed by: EMERGENCY MEDICINE

## 2020-05-19 PROCEDURE — 36415 COLL VENOUS BLD VENIPUNCTURE: CPT

## 2020-05-19 PROCEDURE — 93010 ELECTROCARDIOGRAM REPORT: CPT

## 2020-05-19 PROCEDURE — A9575 INJ GADOTERATE MEGLUMI 0.1ML: HCPCS | Performed by: EMERGENCY MEDICINE

## 2020-05-19 PROCEDURE — 85610 PROTHROMBIN TIME: CPT | Performed by: EMERGENCY MEDICINE

## 2020-05-19 PROCEDURE — 70553 MRI BRAIN STEM W/O & W/DYE: CPT | Performed by: EMERGENCY MEDICINE

## 2020-05-19 PROCEDURE — 70549 MR ANGIOGRAPH NECK W/O&W/DYE: CPT | Performed by: EMERGENCY MEDICINE

## 2020-05-19 PROCEDURE — 93005 ELECTROCARDIOGRAM TRACING: CPT

## 2020-05-19 RX ORDER — MECLIZINE HYDROCHLORIDE 25 MG/1
25 TABLET ORAL 3 TIMES DAILY PRN
Qty: 20 TABLET | Refills: 0 | Status: SHIPPED | OUTPATIENT
Start: 2020-05-19 | End: 2020-07-13 | Stop reason: ALTCHOICE

## 2020-05-19 NOTE — ED PROVIDER NOTES
Patient Seen in: [facility] Emergency Department      History   Patient presents with:  Stroke  Dizziness    Stated Complaint: Dizziness, difficulty finding words     HPI    Patient is a 43-year-old female comes in emergency room for multiple neurol 2005; hemorrhoids   • COLONOSCOPY N/A 8/8/2013    Performed by Elisha Lee MD at [facility] ENDOSCOPY   • ESOPHAGOGASTRODUODENOSCOPY, POSSIBLE BIOPSY, POSSIBLE POLYPECTOMY 19880 N/A 3/25/2015    Performed by Elisha Lee MD at [facility] Other systems are as noted in HPI. Constitutional and vital signs reviewed. All other systems reviewed and negative except as noted above.     Physical Exam     ED Triage Vitals [05/19/20 0956]   /68   Pulse 69   Resp 12   Temp 98.5 °F (36.9 ° Labs Reviewed   COMP METABOLIC PANEL (14) - Abnormal; Notable for the following components:       Result Value    Glucose 107 (*)     All other components within normal limits   PROTHROMBIN TIME (PT) - Abnormal; Notable for the following components: criteria. PATIENT STATED HISTORY:(As transcribed by Technologist)  The patient has been having trouble finding words for 2-3 days and felt dizzy last night and this morning.  The patient fell about 4 days ago and has history of CVA in 2016 and without defi criteria. The cervical internal carotid arteries are widely patent. The vertebral arteries originate from the subclavian arteries. The origins of the vertebral arteries are patent. The cervical vertebral arteries are widely patent.   The left vertebral discharge and follow-up instructions were provided to help prevent relapse or worsening.   Patient was instructed to follow-up with her primary care provider for further evaluation and treatment, but to return immediately to the ER for worsening, concerning

## 2020-05-19 NOTE — ED INITIAL ASSESSMENT (HPI)
Patient has been having trouble finding words for 2-3 days. Felt dizzy last night and this morning. Fell 4 days ago due to a trip and injured her knee, but did not hit her head. History of CVA in 2016 with no deficits. YONATHAN Malone called at 0949 per MD

## 2020-05-19 NOTE — ED NOTES
MD at bedside. Pt with symptoms reported for past 2-3 days. Pt awake and alert, seated over side of cart. Pt appears comfortable, speech clear, pt following all commands appropriately.  Decision made by MD due to time pt is not a code stroke and will procee

## 2020-05-21 ENCOUNTER — TELEPHONE (OUTPATIENT)
Dept: NEUROLOGY | Facility: CLINIC | Age: 65
End: 2020-05-21

## 2020-05-21 NOTE — TELEPHONE ENCOUNTER
TIA CLINIC SCREENING    1. Date of ED visit/TIA diagnosis:  5/19/2020   Time of discharge from ED:  1320    2. Is patient currently admitted? No   If YES - TIA Clinic Appointment not required.       3. Does patient already see an AB[facility] neurologist?  No  Name

## 2020-07-07 ENCOUNTER — HOSPITAL ENCOUNTER (OUTPATIENT)
Dept: CV DIAGNOSTICS | Facility: HOSPITAL | Age: 65
Discharge: HOME | End: 2020-07-07
Attending: INTERNAL MEDICINE
Payer: MEDICARE

## 2020-07-07 DIAGNOSIS — G45.9 TIA (TRANSIENT ISCHEMIC ATTACK): ICD-10-CM

## 2020-07-07 PROCEDURE — 93306 TTE W/DOPPLER COMPLETE: CPT | Performed by: INTERNAL MEDICINE

## 2021-01-06 PROBLEM — M54.2 POSTERIOR NECK PAIN: Status: ACTIVE | Noted: 2018-04-26

## 2021-01-06 PROBLEM — M47.812 CERVICAL SPONDYLOSIS WITHOUT MYELOPATHY: Status: ACTIVE | Noted: 2021-01-06

## 2021-06-11 PROBLEM — D68.59 HYPERCOAGULABLE STATE (HCC): Status: ACTIVE | Noted: 2021-06-11

## 2021-06-11 PROBLEM — Z86.711 HISTORY OF PULMONARY EMBOLISM: Status: ACTIVE | Noted: 2021-06-11

## 2021-10-06 ENCOUNTER — APPOINTMENT (OUTPATIENT)
Dept: CT IMAGING | Age: 66
End: 2021-10-06
Attending: EMERGENCY MEDICINE
Payer: MEDICARE

## 2021-10-06 ENCOUNTER — HOSPITAL ENCOUNTER (EMERGENCY)
Age: 66
Discharge: HOME | End: 2021-10-06
Attending: EMERGENCY MEDICINE
Payer: MEDICARE

## 2021-10-06 VITALS
SYSTOLIC BLOOD PRESSURE: 141 MMHG | HEIGHT: 67 IN | WEIGHT: 220 LBS | TEMPERATURE: 98 F | DIASTOLIC BLOOD PRESSURE: 40 MMHG | BODY MASS INDEX: 34.53 KG/M2 | RESPIRATION RATE: 18 BRPM | HEART RATE: 50 BPM | OXYGEN SATURATION: 96 %

## 2021-10-06 DIAGNOSIS — K57.32 SIGMOID DIVERTICULITIS: Primary | ICD-10-CM

## 2021-10-06 PROCEDURE — 99284 EMERGENCY DEPT VISIT MOD MDM: CPT

## 2021-10-06 PROCEDURE — S0030 INJECTION, METRONIDAZOLE: HCPCS | Performed by: EMERGENCY MEDICINE

## 2021-10-06 PROCEDURE — 83690 ASSAY OF LIPASE: CPT | Performed by: EMERGENCY MEDICINE

## 2021-10-06 PROCEDURE — 96375 TX/PRO/DX INJ NEW DRUG ADDON: CPT

## 2021-10-06 PROCEDURE — 81001 URINALYSIS AUTO W/SCOPE: CPT | Performed by: EMERGENCY MEDICINE

## 2021-10-06 PROCEDURE — 87086 URINE CULTURE/COLONY COUNT: CPT | Performed by: EMERGENCY MEDICINE

## 2021-10-06 PROCEDURE — 96361 HYDRATE IV INFUSION ADD-ON: CPT

## 2021-10-06 PROCEDURE — 85025 COMPLETE CBC W/AUTO DIFF WBC: CPT | Performed by: EMERGENCY MEDICINE

## 2021-10-06 PROCEDURE — 99285 EMERGENCY DEPT VISIT HI MDM: CPT

## 2021-10-06 PROCEDURE — 96367 TX/PROPH/DG ADDL SEQ IV INF: CPT

## 2021-10-06 PROCEDURE — 74177 CT ABD & PELVIS W/CONTRAST: CPT | Performed by: EMERGENCY MEDICINE

## 2021-10-06 PROCEDURE — 96365 THER/PROPH/DIAG IV INF INIT: CPT

## 2021-10-06 PROCEDURE — 80053 COMPREHEN METABOLIC PANEL: CPT | Performed by: EMERGENCY MEDICINE

## 2021-10-06 RX ORDER — LEVOFLOXACIN 5 MG/ML
500 INJECTION, SOLUTION INTRAVENOUS ONCE
Status: COMPLETED | OUTPATIENT
Start: 2021-10-06 | End: 2021-10-06

## 2021-10-06 RX ORDER — METRONIDAZOLE 500 MG/1
500 TABLET ORAL 3 TIMES DAILY
Qty: 30 TABLET | Refills: 0 | Status: SHIPPED | OUTPATIENT
Start: 2021-10-06 | End: 2021-10-16

## 2021-10-06 RX ORDER — LEVOFLOXACIN 500 MG/1
500 TABLET, FILM COATED ORAL DAILY
Qty: 10 TABLET | Refills: 0 | Status: SHIPPED | OUTPATIENT
Start: 2021-10-07 | End: 2021-10-17

## 2021-10-06 RX ORDER — ONDANSETRON 2 MG/ML
4 INJECTION INTRAMUSCULAR; INTRAVENOUS
Status: DISCONTINUED | OUTPATIENT
Start: 2021-10-06 | End: 2021-10-06

## 2021-10-06 RX ORDER — ONDANSETRON 8 MG/1
8 TABLET, ORALLY DISINTEGRATING ORAL EVERY 6 HOURS PRN
Qty: 10 TABLET | Refills: 0 | Status: SHIPPED | OUTPATIENT
Start: 2021-10-06 | End: 2022-01-18 | Stop reason: ALTCHOICE

## 2021-10-06 RX ORDER — KETOROLAC TROMETHAMINE 30 MG/ML
30 INJECTION, SOLUTION INTRAMUSCULAR; INTRAVENOUS ONCE
Status: COMPLETED | OUTPATIENT
Start: 2021-10-06 | End: 2021-10-06

## 2021-10-06 RX ORDER — METRONIDAZOLE 500 MG/100ML
500 INJECTION, SOLUTION INTRAVENOUS ONCE
Status: COMPLETED | OUTPATIENT
Start: 2021-10-06 | End: 2021-10-06

## 2021-10-06 RX ORDER — SODIUM CHLORIDE 9 MG/ML
INJECTION, SOLUTION INTRAVENOUS CONTINUOUS
Status: DISCONTINUED | OUTPATIENT
Start: 2021-10-06 | End: 2021-10-06

## 2021-10-06 NOTE — ED PROVIDER NOTES
Patient Seen in: THE [facility] Emergency Department In [place]      History   Patient presents with:  Abdomen/Flank Pain    Stated Complaint: LLQ pain x 1 week, chills     Subjective:   HPI    Patient complains of lower abdominal pain and gestures over the l Procedure: COLONOSCOPY;  Surgeon: Jerad Lee MD;  Location: [facility] ENDOSCOPY   • EXCIS VAGINAL CYST/TUMOR      2004   • FOOT/TOES SURGERY PROC UNLISTED  2004    eli Fx (R)   • FOOT/TOES SURGERY PROC UNLISTED  20 years ago    excision of neuroma 1st IS (R) °F (36.6 °C)   Temp src    SpO2 96 %   O2 Device None (Room air)       Current:/48   Pulse 63   Temp 97.9 °F (36.6 °C)   Resp 18   Ht 170.2 cm (5' 7\")   Wt 99.8 kg   LMP  (LMP Unknown)   SpO2 99%   BMI 34.46 kg/m²         Physical Exam  General: The W/ DIFFERENTIAL[[id number]]                              Final result                 Please view results for these tests on the individual orders.    URINE CULTURE, ROUTINE   CBC W/ DIFFERENTIAL                   MDM      Patient complains of lower abdomina (three) times daily for 10 days. Qty: 30 tablet Refills: 0    ondansetron 8 MG Oral Tablet Dispersible  Take 1 tablet (8 mg total) by mouth every 6 (six) hours as needed for Nausea.   Qty: 10 tablet Refills: 0

## 2021-10-08 ENCOUNTER — HOSPITAL ENCOUNTER (EMERGENCY)
Facility: HOSPITAL | Age: 66
Discharge: HOME | End: 2021-10-08
Attending: EMERGENCY MEDICINE
Payer: MEDICARE

## 2021-10-08 VITALS
RESPIRATION RATE: 20 BRPM | DIASTOLIC BLOOD PRESSURE: 74 MMHG | OXYGEN SATURATION: 95 % | SYSTOLIC BLOOD PRESSURE: 132 MMHG | TEMPERATURE: 98 F | HEART RATE: 56 BPM

## 2021-10-08 DIAGNOSIS — K57.92 ACUTE DIVERTICULITIS: ICD-10-CM

## 2021-10-08 DIAGNOSIS — K59.00 CONSTIPATION, UNSPECIFIED CONSTIPATION TYPE: Primary | ICD-10-CM

## 2021-10-08 PROCEDURE — 99284 EMERGENCY DEPT VISIT MOD MDM: CPT

## 2021-10-08 PROCEDURE — 81003 URINALYSIS AUTO W/O SCOPE: CPT | Performed by: EMERGENCY MEDICINE

## 2021-10-08 PROCEDURE — 85025 COMPLETE CBC W/AUTO DIFF WBC: CPT | Performed by: EMERGENCY MEDICINE

## 2021-10-08 PROCEDURE — 36415 COLL VENOUS BLD VENIPUNCTURE: CPT

## 2021-10-08 PROCEDURE — 80053 COMPREHEN METABOLIC PANEL: CPT | Performed by: EMERGENCY MEDICINE

## 2021-10-08 RX ORDER — KETOROLAC TROMETHAMINE 30 MG/ML
15 INJECTION, SOLUTION INTRAMUSCULAR; INTRAVENOUS ONCE
Status: COMPLETED | OUTPATIENT
Start: 2021-10-08 | End: 2021-10-08

## 2021-10-08 NOTE — ED INITIAL ASSESSMENT (HPI)
Diagnosed 2 days ago with diverticulitis. Has been on levaquin and flagyl since then, is reporting severe constipation and minimal relief from pain, along with intermittent fever, chills, and nausea. LBM 12 days ago.

## 2021-10-08 NOTE — ED PROVIDER NOTES
Patient Seen in: [facility] Emergency Department      History   Patient presents with:  Abdomen/Flank Pain    Stated Complaint: abdominal pain    Subjective:   HPI    This is a 49-year-old female past medical history of coronary disease, DVT diagnos hemorrhoids   • COLONOSCOPY  8/8/2013    Procedure: COLONOSCOPY;  Surgeon: Jonny Luciano MD;  Location: [address] [place] [address] ENDOSCOPY   • EXCIS VAGINAL CYST/TUMOR      2004   • FOOT/TOES SURGERY PROC UNLISTED  2004    eli Pinon (R)   • FOOT/TOES SURGERY PROC UNLISTED  20 year Pulse 67   Resp 20   Temp 98.2 °F (36.8 °C)   Temp src    SpO2 97 %   O2 Device None (Room air)       Current:/74   Pulse 56   Temp 98.2 °F (36.8 °C)   Resp 20   LMP  (LMP Unknown)   SpO2 95%         Physical Exam    GENERAL: Awake, alert oriented days.  Ibuprofen or Tylenol for pain. Return if worse. Follow-up with them on Monday. Patient discharged home in good condition.             Disposition and Plan     Clinical Impression:  Constipation, unspecified constipation type  (primary encounter di

## 2021-11-02 PROBLEM — L40.9 PSORIASIS: Status: ACTIVE | Noted: 2021-11-02

## 2021-11-02 PROBLEM — L20.9 ATOPIC DERMATITIS AND RELATED CONDITION: Status: ACTIVE | Noted: 2021-08-29

## 2021-11-02 PROBLEM — R06.02 SHORTNESS OF BREATH: Status: ACTIVE | Noted: 2021-11-02

## 2021-11-02 PROBLEM — R00.2 PALPITATIONS: Status: ACTIVE | Noted: 2021-11-02

## 2021-11-02 PROBLEM — I45.9 CARDIAC CONDUCTION DISORDER: Status: ACTIVE | Noted: 2021-11-02

## 2021-11-02 PROBLEM — M32.9 SYSTEMIC LUPUS ERYTHEMATOSUS (HCC): Status: ACTIVE | Noted: 2021-11-02

## 2021-11-02 PROBLEM — H26.9 CATARACT: Status: ACTIVE | Noted: 2021-11-02

## 2021-11-02 PROBLEM — R07.9 CHEST PAIN: Status: ACTIVE | Noted: 2021-11-02

## 2021-11-02 PROBLEM — F41.9 ANXIETY: Status: ACTIVE | Noted: 2021-11-02

## 2021-11-02 PROBLEM — R42 VERTIGO: Status: RESOLVED | Noted: 2020-02-12 | Resolved: 2021-11-02

## 2022-01-04 ENCOUNTER — ANESTHESIA EVENT (OUTPATIENT)
Dept: ENDOSCOPY | Facility: HOSPITAL | Age: 67
End: 2022-01-04
Payer: MEDICARE

## 2022-01-04 NOTE — PAT NURSING NOTE
Chart sent to anesthesia for review due to +COVID on 12/20/21. Order received from Dr. Carla Myrick to notify Dr. Geneva Boone office of the  COVID 19 Surgery Scheduling algorithm .   Telephoned Dr. Brandy Glynn office and left a voice mail message for [facility] infor

## 2022-01-07 ENCOUNTER — ANESTHESIA (OUTPATIENT)
Dept: ENDOSCOPY | Facility: HOSPITAL | Age: 67
End: 2022-01-07
Payer: MEDICARE

## 2022-01-18 PROBLEM — I47.10 PSVT (PAROXYSMAL SUPRAVENTRICULAR TACHYCARDIA) (HCC): Status: ACTIVE | Noted: 2022-01-18

## 2022-01-18 PROBLEM — I47.1 PSVT (PAROXYSMAL SUPRAVENTRICULAR TACHYCARDIA) (HCC): Status: ACTIVE | Noted: 2022-01-18

## 2022-01-21 ENCOUNTER — HOSPITAL ENCOUNTER (OUTPATIENT)
Facility: HOSPITAL | Age: 67
Setting detail: HOSPITAL OUTPATIENT SURGERY
Discharge: HOME | End: 2022-01-21
Attending: INTERNAL MEDICINE | Admitting: INTERNAL MEDICINE
Payer: MEDICARE

## 2022-01-21 VITALS
BODY MASS INDEX: 35.36 KG/M2 | OXYGEN SATURATION: 97 % | HEIGHT: 66 IN | TEMPERATURE: 98 F | HEART RATE: 67 BPM | WEIGHT: 220 LBS | RESPIRATION RATE: 18 BRPM | DIASTOLIC BLOOD PRESSURE: 93 MMHG | SYSTOLIC BLOOD PRESSURE: 125 MMHG

## 2022-01-21 DIAGNOSIS — Z12.11 COLON CANCER SCREENING: ICD-10-CM

## 2022-01-21 LAB — GLUCOSE BLD-MCNC: 94 MG/DL (ref 70–99)

## 2022-01-21 PROCEDURE — 82962 GLUCOSE BLOOD TEST: CPT

## 2022-01-21 PROCEDURE — 0DJD8ZZ INSPECTION OF LOWER INTESTINAL TRACT, VIA NATURAL OR ARTIFICIAL OPENING ENDOSCOPIC: ICD-10-PCS | Performed by: INTERNAL MEDICINE

## 2022-01-21 RX ORDER — SODIUM CHLORIDE, SODIUM LACTATE, POTASSIUM CHLORIDE, CALCIUM CHLORIDE 600; 310; 30; 20 MG/100ML; MG/100ML; MG/100ML; MG/100ML
INJECTION, SOLUTION INTRAVENOUS CONTINUOUS
Status: DISCONTINUED | OUTPATIENT
Start: 2022-01-21 | End: 2022-01-21

## 2022-01-21 RX ORDER — METOCLOPRAMIDE HYDROCHLORIDE 5 MG/ML
10 INJECTION INTRAMUSCULAR; INTRAVENOUS AS NEEDED
Status: DISCONTINUED | OUTPATIENT
Start: 2022-01-21 | End: 2022-01-21

## 2022-01-21 RX ORDER — LIDOCAINE HYDROCHLORIDE 10 MG/ML
INJECTION, SOLUTION EPIDURAL; INFILTRATION; INTRACAUDAL; PERINEURAL AS NEEDED
Status: DISCONTINUED | OUTPATIENT
Start: 2022-01-21 | End: 2022-01-21 | Stop reason: SURG

## 2022-01-21 RX ORDER — NALOXONE HYDROCHLORIDE 0.4 MG/ML
80 INJECTION, SOLUTION INTRAMUSCULAR; INTRAVENOUS; SUBCUTANEOUS AS NEEDED
Status: DISCONTINUED | OUTPATIENT
Start: 2022-01-21 | End: 2022-01-21

## 2022-01-21 RX ORDER — DEXTROSE MONOHYDRATE 25 G/50ML
50 INJECTION, SOLUTION INTRAVENOUS
Status: DISCONTINUED | OUTPATIENT
Start: 2022-01-21 | End: 2022-01-21

## 2022-01-21 RX ORDER — DIPHENHYDRAMINE HYDROCHLORIDE 50 MG/ML
12.5 INJECTION INTRAMUSCULAR; INTRAVENOUS AS NEEDED
Status: DISCONTINUED | OUTPATIENT
Start: 2022-01-21 | End: 2022-01-21

## 2022-01-21 RX ORDER — ONDANSETRON 2 MG/ML
4 INJECTION INTRAMUSCULAR; INTRAVENOUS AS NEEDED
Status: DISCONTINUED | OUTPATIENT
Start: 2022-01-21 | End: 2022-01-21

## 2022-01-21 RX ORDER — HYDROMORPHONE HYDROCHLORIDE 1 MG/ML
0.4 INJECTION, SOLUTION INTRAMUSCULAR; INTRAVENOUS; SUBCUTANEOUS EVERY 5 MIN PRN
Status: DISCONTINUED | OUTPATIENT
Start: 2022-01-21 | End: 2022-01-21

## 2022-01-21 RX ADMIN — LIDOCAINE HYDROCHLORIDE 50 MG: 10 INJECTION, SOLUTION EPIDURAL; INFILTRATION; INTRACAUDAL; PERINEURAL at 10:39:00

## 2022-01-21 NOTE — OPERATIVE REPORT
[address] Patient Status:  [facility] Outpatient Surgery    1955 MRN [id number]   Location [address] [facility] 28 Attending Tanya Britt, [facility] Day # 0 PCP Jana Valdes MD       PREOPERATIVE DIAGNOSIS/INDICAT pattern were normal.  Transverse colon: The mucosa and vascular pattern were normal.  Descending colon: The mucosa and vascular pattern were normal.  Sigmoid colon: Diverticulosis. Rectum:  The mucosa and vascular pattern were normal. Retroflexion reveale

## 2022-01-21 NOTE — ANESTHESIA POSTPROCEDURE EVALUATION
[place] [address] Patient Status:  Hospital Outpatient Surgery   Age/Gender 74year old female MRN [id number]   Location [address] [place] [address] Attending Gayla Chou, [address] [facility] Day # 0 PCP MD Dick Jackman

## 2022-01-21 NOTE — ANESTHESIA PREPROCEDURE EVALUATION
PRE-OP EVALUATION    Patient Name: Krunal Kenney [facility]    Admit Diagnosis: Colon cancer screening [Z12.11]    Pre-op Diagnosis: Colon cancer screening [Z12.11]    COLONOSCOPY    Anesthesia Procedure: COLONOSCOPY (N/A )    Surgeon(s) and Role:     * Scott • SKIN SURGERY      squamous cell chest  2008   • SLING OPER STRES INCONTINENCE  2018   • TOTAL ABDOM HYSTERECTOMY     • TUBAL LIGATION     • UPPER GI ENDOSCOPY - REFERRAL      REFLUX ESOPHAGITIS, NON-OBS SCHATZKI'S RING, GASTRITIS   • UPPER GI ENDOSCOPY patient                Present on Admission:  **None**

## 2022-01-27 NOTE — H&P
History & Physical Examination    Patient Name: Laurie Garvey  MRN: [id number]  CSN: [id number]  YOB: 1955    Diagnosis: history of acute diverticulitis    Present Illness: 73 y/o F history as above presents for Colonoscopy.      No medicat or localized, unspecified site    • Pain in joints    • Painful urination    • PULMONARY EMBOLISM    • Rash    • Reflux    • Retinal tear    • Shortness of breath    • Skin blushing/flushing    • Sleep disturbance    • Squamous cell skin cancer    • Stool Procedure: ESOPHAGOGASTRODUODENOSCOPY, POSSIBLE BIOPSY, POSSIBLE POLYPECTOMY 04518;  Surgeon: Catrina Saba MD;  Location: [address] [place] [address]   • UPPER GI ENDOSCOPY,BIOPSY N/A 3/25/2015    Procedure: ESOPHAGOGASTRODUODENOSCOPY, POSSIBLE BIOPSY, POSSIBL

## 2022-02-15 ENCOUNTER — ANESTHESIA EVENT (OUTPATIENT)
Dept: SURGERY | Facility: HOSPITAL | Age: 67
DRG: 473 | End: 2022-02-15
Payer: MEDICARE

## 2022-02-16 ENCOUNTER — ANESTHESIA (OUTPATIENT)
Dept: SURGERY | Facility: HOSPITAL | Age: 67
DRG: 473 | End: 2022-02-16
Payer: MEDICARE

## 2022-02-16 ENCOUNTER — HOSPITAL ENCOUNTER (INPATIENT)
Facility: HOSPITAL | Age: 67
LOS: 1 days | Discharge: HOME | DRG: 473 | End: 2022-02-17
Attending: ORTHOPAEDIC SURGERY | Admitting: ORTHOPAEDIC SURGERY
Payer: MEDICARE

## 2022-02-16 ENCOUNTER — APPOINTMENT (OUTPATIENT)
Dept: GENERAL RADIOLOGY | Facility: HOSPITAL | Age: 67
DRG: 473 | End: 2022-02-16
Attending: ORTHOPAEDIC SURGERY
Payer: MEDICARE

## 2022-02-16 DIAGNOSIS — M43.12 SPONDYLOLISTHESIS OF CERVICAL REGION: ICD-10-CM

## 2022-02-16 LAB
ANTIBODY SCREEN: NEGATIVE
RH BLOOD TYPE: POSITIVE

## 2022-02-16 PROCEDURE — 4A11X4G MONITORING OF PERIPHERAL NERVOUS ELECTRICAL ACTIVITY, INTRAOPERATIVE, EXTERNAL APPROACH: ICD-10-PCS | Performed by: ORTHOPAEDIC SURGERY

## 2022-02-16 PROCEDURE — 86900 BLOOD TYPING SEROLOGIC ABO: CPT | Performed by: ORTHOPAEDIC SURGERY

## 2022-02-16 PROCEDURE — 5A09357 ASSISTANCE WITH RESPIRATORY VENTILATION, LESS THAN 24 CONSECUTIVE HOURS, CONTINUOUS POSITIVE AIRWAY PRESSURE: ICD-10-PCS | Performed by: ORTHOPAEDIC SURGERY

## 2022-02-16 PROCEDURE — 95939 C MOTOR EVOKED UPR&LWR LIMBS: CPT | Performed by: ORTHOPAEDIC SURGERY

## 2022-02-16 PROCEDURE — 76000 FLUOROSCOPY <1 HR PHYS/QHP: CPT | Performed by: ORTHOPAEDIC SURGERY

## 2022-02-16 PROCEDURE — 95940 IONM IN OPERATNG ROOM 15 MIN: CPT | Performed by: ORTHOPAEDIC SURGERY

## 2022-02-16 PROCEDURE — 0RT30ZZ RESECTION OF CERVICAL VERTEBRAL DISC, OPEN APPROACH: ICD-10-PCS | Performed by: ORTHOPAEDIC SURGERY

## 2022-02-16 PROCEDURE — 86850 RBC ANTIBODY SCREEN: CPT | Performed by: ORTHOPAEDIC SURGERY

## 2022-02-16 PROCEDURE — 95938 SOMATOSENSORY TESTING: CPT | Performed by: ORTHOPAEDIC SURGERY

## 2022-02-16 PROCEDURE — 0RG10A0 FUSION OF CERVICAL VERTEBRAL JOINT WITH INTERBODY FUSION DEVICE, ANTERIOR APPROACH, ANTERIOR COLUMN, OPEN APPROACH: ICD-10-PCS | Performed by: ORTHOPAEDIC SURGERY

## 2022-02-16 PROCEDURE — 94660 CPAP INITIATION&MGMT: CPT

## 2022-02-16 PROCEDURE — 86901 BLOOD TYPING SEROLOGIC RH(D): CPT | Performed by: ORTHOPAEDIC SURGERY

## 2022-02-16 PROCEDURE — 85730 THROMBOPLASTIN TIME PARTIAL: CPT

## 2022-02-16 PROCEDURE — 95861 NEEDLE EMG 2 EXTREMITIES: CPT | Performed by: ORTHOPAEDIC SURGERY

## 2022-02-16 PROCEDURE — 76942 ECHO GUIDE FOR BIOPSY: CPT | Performed by: STUDENT IN AN ORGANIZED HEALTH CARE EDUCATION/TRAINING PROGRAM

## 2022-02-16 DEVICE — OSTEOCEL PRO SMALL: Type: IMPLANTABLE DEVICE | Site: SPINE CERVICAL | Status: FUNCTIONAL

## 2022-02-16 DEVICE — IMPLANTABLE DEVICE: Type: IMPLANTABLE DEVICE | Site: SPINE CERVICAL | Status: FUNCTIONAL

## 2022-02-16 DEVICE — PLATE CERVICAL 1.6V LVL1 18MM: Type: IMPLANTABLE DEVICE | Site: SPINE CERVICAL | Status: FUNCTIONAL

## 2022-02-16 RX ORDER — OXYCODONE HYDROCHLORIDE AND ACETAMINOPHEN 5; 325 MG/1; MG/1
2 TABLET ORAL EVERY 4 HOURS PRN
Status: DISCONTINUED | OUTPATIENT
Start: 2022-02-16 | End: 2022-02-17

## 2022-02-16 RX ORDER — MIDAZOLAM HYDROCHLORIDE 1 MG/ML
INJECTION INTRAMUSCULAR; INTRAVENOUS AS NEEDED
Status: DISCONTINUED | OUTPATIENT
Start: 2022-02-16 | End: 2022-02-16 | Stop reason: SURG

## 2022-02-16 RX ORDER — MORPHINE SULFATE 4 MG/ML
4 INJECTION, SOLUTION INTRAMUSCULAR; INTRAVENOUS EVERY 2 HOUR PRN
Status: DISCONTINUED | OUTPATIENT
Start: 2022-02-16 | End: 2022-02-17

## 2022-02-16 RX ORDER — ROCURONIUM BROMIDE 10 MG/ML
INJECTION, SOLUTION INTRAVENOUS AS NEEDED
Status: DISCONTINUED | OUTPATIENT
Start: 2022-02-16 | End: 2022-02-16 | Stop reason: SURG

## 2022-02-16 RX ORDER — METOPROLOL SUCCINATE 50 MG/1
50 TABLET, EXTENDED RELEASE ORAL DAILY
Status: DISCONTINUED | OUTPATIENT
Start: 2022-02-16 | End: 2022-02-17

## 2022-02-16 RX ORDER — ATORVASTATIN CALCIUM 20 MG/1
20 TABLET, FILM COATED ORAL NIGHTLY
Status: DISCONTINUED | OUTPATIENT
Start: 2022-02-16 | End: 2022-02-17

## 2022-02-16 RX ORDER — LIDOCAINE HYDROCHLORIDE 40 MG/ML
SOLUTION TOPICAL AS NEEDED
Status: DISCONTINUED | OUTPATIENT
Start: 2022-02-16 | End: 2022-02-16 | Stop reason: SURG

## 2022-02-16 RX ORDER — HYDROMORPHONE HYDROCHLORIDE 1 MG/ML
0.4 INJECTION, SOLUTION INTRAMUSCULAR; INTRAVENOUS; SUBCUTANEOUS EVERY 5 MIN PRN
Status: DISCONTINUED | OUTPATIENT
Start: 2022-02-16 | End: 2022-02-16 | Stop reason: HOSPADM

## 2022-02-16 RX ORDER — AZELASTINE 1 MG/ML
1 SPRAY, METERED NASAL 2 TIMES DAILY
Status: DISCONTINUED | OUTPATIENT
Start: 2022-02-16 | End: 2022-02-17

## 2022-02-16 RX ORDER — SODIUM CHLORIDE, SODIUM LACTATE, POTASSIUM CHLORIDE, CALCIUM CHLORIDE 600; 310; 30; 20 MG/100ML; MG/100ML; MG/100ML; MG/100ML
INJECTION, SOLUTION INTRAVENOUS CONTINUOUS
Status: DISCONTINUED | OUTPATIENT
Start: 2022-02-16 | End: 2022-02-16 | Stop reason: HOSPADM

## 2022-02-16 RX ORDER — MORPHINE SULFATE 2 MG/ML
2 INJECTION, SOLUTION INTRAMUSCULAR; INTRAVENOUS EVERY 2 HOUR PRN
Status: DISCONTINUED | OUTPATIENT
Start: 2022-02-16 | End: 2022-02-17

## 2022-02-16 RX ORDER — METOPROLOL SUCCINATE 50 MG/1
50 TABLET, EXTENDED RELEASE ORAL DAILY
COMMUNITY

## 2022-02-16 RX ORDER — HYDROMORPHONE HYDROCHLORIDE 1 MG/ML
INJECTION, SOLUTION INTRAMUSCULAR; INTRAVENOUS; SUBCUTANEOUS
Status: COMPLETED
Start: 2022-02-16 | End: 2022-02-16

## 2022-02-16 RX ORDER — HYDROCODONE BITARTRATE AND ACETAMINOPHEN 5; 325 MG/1; MG/1
1 TABLET ORAL AS NEEDED
Status: DISCONTINUED | OUTPATIENT
Start: 2022-02-16 | End: 2022-02-16 | Stop reason: HOSPADM

## 2022-02-16 RX ORDER — CEFAZOLIN SODIUM/WATER 2 G/20 ML
2 SYRINGE (ML) INTRAVENOUS EVERY 8 HOURS
Status: COMPLETED | OUTPATIENT
Start: 2022-02-16 | End: 2022-02-16

## 2022-02-16 RX ORDER — ZOLPIDEM TARTRATE 5 MG/1
5 TABLET ORAL NIGHTLY PRN
Status: DISCONTINUED | OUTPATIENT
Start: 2022-02-16 | End: 2022-02-17

## 2022-02-16 RX ORDER — NEOSTIGMINE METHYLSULFATE 1 MG/ML
INJECTION INTRAVENOUS AS NEEDED
Status: DISCONTINUED | OUTPATIENT
Start: 2022-02-16 | End: 2022-02-16 | Stop reason: SURG

## 2022-02-16 RX ORDER — DIPHENHYDRAMINE HYDROCHLORIDE 50 MG/ML
25 INJECTION INTRAMUSCULAR; INTRAVENOUS EVERY 4 HOURS PRN
Status: DISCONTINUED | OUTPATIENT
Start: 2022-02-16 | End: 2022-02-17

## 2022-02-16 RX ORDER — ACETAMINOPHEN 500 MG
1000 TABLET ORAL ONCE
Status: DISCONTINUED | OUTPATIENT
Start: 2022-02-16 | End: 2022-02-16 | Stop reason: HOSPADM

## 2022-02-16 RX ORDER — ONDANSETRON 2 MG/ML
4 INJECTION INTRAMUSCULAR; INTRAVENOUS AS NEEDED
Status: DISCONTINUED | OUTPATIENT
Start: 2022-02-16 | End: 2022-02-16 | Stop reason: HOSPADM

## 2022-02-16 RX ORDER — ACETAMINOPHEN 325 MG/1
650 TABLET ORAL EVERY 4 HOURS PRN
Status: DISCONTINUED | OUTPATIENT
Start: 2022-02-16 | End: 2022-02-17

## 2022-02-16 RX ORDER — HYDROCODONE BITARTRATE AND ACETAMINOPHEN 5; 325 MG/1; MG/1
2 TABLET ORAL AS NEEDED
Status: DISCONTINUED | OUTPATIENT
Start: 2022-02-16 | End: 2022-02-16 | Stop reason: HOSPADM

## 2022-02-16 RX ORDER — FLUTICASONE PROPIONATE 50 MCG
2 SPRAY, SUSPENSION (ML) NASAL DAILY
Status: DISCONTINUED | OUTPATIENT
Start: 2022-02-16 | End: 2022-02-17

## 2022-02-16 RX ORDER — PANTOPRAZOLE SODIUM 20 MG/1
20 TABLET, DELAYED RELEASE ORAL
Refills: 3 | Status: DISCONTINUED | OUTPATIENT
Start: 2022-02-17 | End: 2022-02-17

## 2022-02-16 RX ORDER — MORPHINE SULFATE 2 MG/ML
1 INJECTION, SOLUTION INTRAMUSCULAR; INTRAVENOUS EVERY 2 HOUR PRN
Status: DISCONTINUED | OUTPATIENT
Start: 2022-02-16 | End: 2022-02-17

## 2022-02-16 RX ORDER — SENNOSIDES 8.6 MG
17.2 TABLET ORAL NIGHTLY
Status: DISCONTINUED | OUTPATIENT
Start: 2022-02-16 | End: 2022-02-17

## 2022-02-16 RX ORDER — ACETAMINOPHEN 500 MG
1000 TABLET ORAL ONCE AS NEEDED
Status: DISCONTINUED | OUTPATIENT
Start: 2022-02-16 | End: 2022-02-16 | Stop reason: HOSPADM

## 2022-02-16 RX ORDER — DEXAMETHASONE SODIUM PHOSPHATE 4 MG/ML
VIAL (ML) INJECTION AS NEEDED
Status: DISCONTINUED | OUTPATIENT
Start: 2022-02-16 | End: 2022-02-16 | Stop reason: SURG

## 2022-02-16 RX ORDER — METHYLPREDNISOLONE ACETATE 40 MG/ML
INJECTION, SUSPENSION INTRA-ARTICULAR; INTRALESIONAL; INTRAMUSCULAR; SOFT TISSUE AS NEEDED
Status: DISCONTINUED | OUTPATIENT
Start: 2022-02-16 | End: 2022-02-16 | Stop reason: HOSPADM

## 2022-02-16 RX ORDER — SODIUM CHLORIDE 9 MG/ML
INJECTION, SOLUTION INTRAVENOUS CONTINUOUS
Status: DISCONTINUED | OUTPATIENT
Start: 2022-02-16 | End: 2022-02-16 | Stop reason: HOSPADM

## 2022-02-16 RX ORDER — NICOTINE POLACRILEX 4 MG
15 LOZENGE BUCCAL
Status: DISCONTINUED | OUTPATIENT
Start: 2022-02-16 | End: 2022-02-16 | Stop reason: HOSPADM

## 2022-02-16 RX ORDER — LABETALOL HYDROCHLORIDE 5 MG/ML
5 INJECTION, SOLUTION INTRAVENOUS EVERY 5 MIN PRN
Status: DISCONTINUED | OUTPATIENT
Start: 2022-02-16 | End: 2022-02-16 | Stop reason: HOSPADM

## 2022-02-16 RX ORDER — MEPERIDINE HYDROCHLORIDE 25 MG/ML
12.5 INJECTION INTRAMUSCULAR; INTRAVENOUS; SUBCUTANEOUS AS NEEDED
Status: DISCONTINUED | OUTPATIENT
Start: 2022-02-16 | End: 2022-02-16 | Stop reason: HOSPADM

## 2022-02-16 RX ORDER — NALOXONE HYDROCHLORIDE 0.4 MG/ML
80 INJECTION, SOLUTION INTRAMUSCULAR; INTRAVENOUS; SUBCUTANEOUS AS NEEDED
Status: DISCONTINUED | OUTPATIENT
Start: 2022-02-16 | End: 2022-02-16 | Stop reason: HOSPADM

## 2022-02-16 RX ORDER — VANCOMYCIN HYDROCHLORIDE 1 G/20ML
INJECTION, POWDER, LYOPHILIZED, FOR SOLUTION INTRAVENOUS AS NEEDED
Status: DISCONTINUED | OUTPATIENT
Start: 2022-02-16 | End: 2022-02-16 | Stop reason: HOSPADM

## 2022-02-16 RX ORDER — ALPRAZOLAM 0.5 MG/1
0.5 TABLET ORAL AS NEEDED
Status: DISCONTINUED | OUTPATIENT
Start: 2022-02-16 | End: 2022-02-17

## 2022-02-16 RX ORDER — MIDAZOLAM HYDROCHLORIDE 1 MG/ML
1 INJECTION INTRAMUSCULAR; INTRAVENOUS EVERY 5 MIN PRN
Status: DISCONTINUED | OUTPATIENT
Start: 2022-02-16 | End: 2022-02-16 | Stop reason: HOSPADM

## 2022-02-16 RX ORDER — DOCUSATE SODIUM 100 MG/1
100 CAPSULE, LIQUID FILLED ORAL 2 TIMES DAILY
Status: DISCONTINUED | OUTPATIENT
Start: 2022-02-16 | End: 2022-02-17

## 2022-02-16 RX ORDER — DIPHENHYDRAMINE HCL 25 MG
25 CAPSULE ORAL EVERY 4 HOURS PRN
Status: DISCONTINUED | OUTPATIENT
Start: 2022-02-16 | End: 2022-02-17

## 2022-02-16 RX ORDER — MIDAZOLAM HYDROCHLORIDE 1 MG/ML
INJECTION INTRAMUSCULAR; INTRAVENOUS
Status: COMPLETED
Start: 2022-02-16 | End: 2022-02-16

## 2022-02-16 RX ORDER — ONDANSETRON 2 MG/ML
INJECTION INTRAMUSCULAR; INTRAVENOUS AS NEEDED
Status: DISCONTINUED | OUTPATIENT
Start: 2022-02-16 | End: 2022-02-16 | Stop reason: SURG

## 2022-02-16 RX ORDER — OXYCODONE HYDROCHLORIDE AND ACETAMINOPHEN 5; 325 MG/1; MG/1
1 TABLET ORAL EVERY 4 HOURS PRN
Status: DISCONTINUED | OUTPATIENT
Start: 2022-02-16 | End: 2022-02-17

## 2022-02-16 RX ORDER — TIZANIDINE HYDROCHLORIDE 4 MG/1
4 CAPSULE, GELATIN COATED ORAL 3 TIMES DAILY
Qty: 30 CAPSULE | Refills: 0 | Status: SHIPPED | OUTPATIENT
Start: 2022-02-16

## 2022-02-16 RX ORDER — ATORVASTATIN CALCIUM 20 MG/1
20 TABLET, FILM COATED ORAL NIGHTLY
COMMUNITY
End: 2022-03-16

## 2022-02-16 RX ORDER — OXYCODONE HYDROCHLORIDE AND ACETAMINOPHEN 5; 325 MG/1; MG/1
1 TABLET ORAL EVERY 6 HOURS PRN
Qty: 30 TABLET | Refills: 0 | Status: SHIPPED | OUTPATIENT
Start: 2022-02-16 | End: 2022-02-26

## 2022-02-16 RX ORDER — LIDOCAINE HYDROCHLORIDE 20 MG/ML
INJECTION, SOLUTION EPIDURAL; INFILTRATION; INTRACAUDAL; PERINEURAL AS NEEDED
Status: DISCONTINUED | OUTPATIENT
Start: 2022-02-16 | End: 2022-02-16 | Stop reason: SURG

## 2022-02-16 RX ORDER — DIAZEPAM 2 MG/1
2 TABLET ORAL EVERY 6 HOURS PRN
Status: DISCONTINUED | OUTPATIENT
Start: 2022-02-16 | End: 2022-02-17

## 2022-02-16 RX ORDER — ONDANSETRON 2 MG/ML
4 INJECTION INTRAMUSCULAR; INTRAVENOUS EVERY 4 HOURS PRN
Status: ACTIVE | OUTPATIENT
Start: 2022-02-16 | End: 2022-02-17

## 2022-02-16 RX ORDER — POLYETHYLENE GLYCOL 3350 17 G/17G
17 POWDER, FOR SOLUTION ORAL DAILY PRN
Status: DISCONTINUED | OUTPATIENT
Start: 2022-02-16 | End: 2022-02-17

## 2022-02-16 RX ORDER — HYDROXYCHLOROQUINE SULFATE 200 MG/1
200 TABLET, FILM COATED ORAL 2 TIMES DAILY
Status: DISCONTINUED | OUTPATIENT
Start: 2022-02-16 | End: 2022-02-17

## 2022-02-16 RX ORDER — CEFAZOLIN SODIUM/WATER 2 G/20 ML
2 SYRINGE (ML) INTRAVENOUS ONCE
Status: COMPLETED | OUTPATIENT
Start: 2022-02-16 | End: 2022-02-16

## 2022-02-16 RX ORDER — DEXTROSE MONOHYDRATE 25 G/50ML
50 INJECTION, SOLUTION INTRAVENOUS
Status: DISCONTINUED | OUTPATIENT
Start: 2022-02-16 | End: 2022-02-16 | Stop reason: HOSPADM

## 2022-02-16 RX ORDER — SODIUM CHLORIDE, SODIUM LACTATE, POTASSIUM CHLORIDE, CALCIUM CHLORIDE 600; 310; 30; 20 MG/100ML; MG/100ML; MG/100ML; MG/100ML
INJECTION, SOLUTION INTRAVENOUS CONTINUOUS
Status: DISCONTINUED | OUTPATIENT
Start: 2022-02-16 | End: 2022-02-17

## 2022-02-16 RX ORDER — BISACODYL 10 MG
10 SUPPOSITORY, RECTAL RECTAL
Status: DISCONTINUED | OUTPATIENT
Start: 2022-02-16 | End: 2022-02-17

## 2022-02-16 RX ORDER — NICOTINE POLACRILEX 4 MG
30 LOZENGE BUCCAL
Status: DISCONTINUED | OUTPATIENT
Start: 2022-02-16 | End: 2022-02-16 | Stop reason: HOSPADM

## 2022-02-16 RX ORDER — SODIUM PHOSPHATE, DIBASIC AND SODIUM PHOSPHATE, MONOBASIC 7; 19 G/133ML; G/133ML
1 ENEMA RECTAL ONCE AS NEEDED
Status: DISCONTINUED | OUTPATIENT
Start: 2022-02-16 | End: 2022-02-17

## 2022-02-16 RX ORDER — PROCHLORPERAZINE EDISYLATE 5 MG/ML
10 INJECTION INTRAMUSCULAR; INTRAVENOUS EVERY 6 HOURS PRN
Status: DISCONTINUED | OUTPATIENT
Start: 2022-02-16 | End: 2022-02-17

## 2022-02-16 RX ORDER — METOCLOPRAMIDE HYDROCHLORIDE 5 MG/ML
10 INJECTION INTRAMUSCULAR; INTRAVENOUS AS NEEDED
Status: DISCONTINUED | OUTPATIENT
Start: 2022-02-16 | End: 2022-02-16 | Stop reason: HOSPADM

## 2022-02-16 RX ORDER — GLYCOPYRROLATE 0.2 MG/ML
INJECTION, SOLUTION INTRAMUSCULAR; INTRAVENOUS AS NEEDED
Status: DISCONTINUED | OUTPATIENT
Start: 2022-02-16 | End: 2022-02-16 | Stop reason: SURG

## 2022-02-16 RX ADMIN — CEFAZOLIN SODIUM/WATER 2 G: 2 G/20 ML SYRINGE (ML) INTRAVENOUS at 08:15:00

## 2022-02-16 RX ADMIN — NEOSTIGMINE METHYLSULFATE 3 MG: 1 INJECTION INTRAVENOUS at 09:08:00

## 2022-02-16 RX ADMIN — GLYCOPYRROLATE 0.4 MG: 0.2 INJECTION, SOLUTION INTRAMUSCULAR; INTRAVENOUS at 09:08:00

## 2022-02-16 RX ADMIN — ROCURONIUM BROMIDE 30 MG: 10 INJECTION, SOLUTION INTRAVENOUS at 07:44:00

## 2022-02-16 RX ADMIN — LIDOCAINE HYDROCHLORIDE 100 MG: 20 INJECTION, SOLUTION EPIDURAL; INFILTRATION; INTRACAUDAL; PERINEURAL at 07:44:00

## 2022-02-16 RX ADMIN — MIDAZOLAM HYDROCHLORIDE 2 MG: 1 INJECTION INTRAMUSCULAR; INTRAVENOUS at 07:40:00

## 2022-02-16 RX ADMIN — ONDANSETRON 4 MG: 2 INJECTION INTRAMUSCULAR; INTRAVENOUS at 09:06:00

## 2022-02-16 RX ADMIN — DEXAMETHASONE SODIUM PHOSPHATE 4 MG: 4 MG/ML VIAL (ML) INJECTION at 08:14:00

## 2022-02-16 RX ADMIN — LIDOCAINE HYDROCHLORIDE 4 ML: 40 SOLUTION TOPICAL at 07:47:00

## 2022-02-16 RX ADMIN — SODIUM CHLORIDE, SODIUM LACTATE, POTASSIUM CHLORIDE, CALCIUM CHLORIDE: 600; 310; 30; 20 INJECTION, SOLUTION INTRAVENOUS at 09:26:00

## 2022-02-16 NOTE — ANESTHESIA PROCEDURE NOTES
Arterial Line  Performed by: Zackery Edmonds MD  Authorized by: Zackery Edmonds MD     General Information and Staff    Procedure Start:  2/16/2022 7:52 AM  Procedure End:  2/16/2022 7:55 AM  Anesthesiologist:  Zackery Edmonds MD  Performed By:  Anesthesiologist  Patient Location:  OR  Indication: continuous blood pressure monitoring and blood sampling needed    Site Identification: real time ultrasound guided, surface landmarks and image stored and retrievable    Preanesthetic Checklist: 2 patient identifiers, IV checked, risks and benefits discussed, monitors and equipment checked, pre-op evaluation, timeout performed, anesthesia consent and sterile technique used    Procedure Details    Catheter Size:  20 G  Catheter Length:  1 and 3/4 inchCatheter Type:  Arrow  Seldinger Technique?: Yes    Laterality:  LeftSite:  Radial artery  Site Prep: alcohol swabs  Line Secured:  Wrist Brace, tape and Tegaderm    Assessment    Events: patient tolerated procedure well with no complications      Medications      Additional Comments

## 2022-02-16 NOTE — PLAN OF CARE
Valium given for neck stiffness and spasms. Reports her pre-op symptoms were headache and neck pain. Denies any numbness or tingling to extremities. Ioban dressing clean and dry. Soft collar in place. Voided up in bathroom. SCD's and teds bilaterally. Vitals stable on room air. Passed dysphagia screening. PT/OT to see tomorrow. Home tomorrow if cleared by all services. Plan of care discussed with patient and daughter who agree.

## 2022-02-16 NOTE — PROGRESS NOTES
Patient admitted via bed from PACU. Oriented to room. Safety precautions initiated. Call light in reach. Daughter at bedside. Passed dysphagia screening.

## 2022-02-16 NOTE — ANESTHESIA PROCEDURE NOTES
Peripheral IV  Date/Time: 2/16/2022 7:59 AM  Inserted by: Aditi Montes De Oca MD    Placement  Needle size: 18 G  Laterality: left  Location: hand  Site prep: alcohol  Technique: anatomical landmarks  Attempts: 1

## 2022-02-16 NOTE — BRIEF OP NOTE
Pre-Operative Diagnosis: Spondylolisthesis of cervical region [M43.12]     Post-Operative Diagnosis: Spondylolisthesis of cervical region [M43.12]      Procedure Performed:   Anterior Cervical Discectomy Fusion Cervical 3 - Cervical 4    Surgeon(s) and Role:     Nini Marvin MD - Primary    Assistant(s):  PA:  Karen Irby PA-C     Surgical Findings:      Specimen:      Estimated Blood Loss: Blood Output: 10 mL (2/16/2022  9:05 AM)      Dictation Number:      Raina EDWIGE Mckeon  2/16/2022  9:31 AM

## 2022-02-16 NOTE — OPERATIVE REPORT
Operative Note     Patient Name: Katharina Aparicio  Date: 2/16/2022  Preoperative Diagnosis: Cervical Radiculopathy C3-4  Postoperative Diagnosis: Same  Primary Surgeon: Dia Blood MD  Assistant: Ryan TORRES  Procedures:   C3-4 Anterior Cervical Discectomy/Fusion CPT 58761  C3-4 Anterior Instrumentation   CPT 47004  Use of Titanium Interbody Graft    CPT 20853 x1  Use of local bone autograft    CPT 18788    Anesthesia: General Endotracheal Anesthesia  Estimated Blood Loss: 10cc  Drains: None  Implants:   Nuvasive C360 Anterior Cervical Plate and Screws 37DC  Nuvasive Modulus interbody cage [id number] x 6height  Bone Graft: ifactor 1cc, local 0.5cc   Specimen: None  Condition: Stable  Complications: None     Surgical Indications:   Katharina Aparicio is an 74year old female who presents with neck issues refractory to nonsurgical care. The option of surgery was discussed with the patient. Risks include, but are not limited to wound complications, bleeding, infection, neurologic injury or onset of a new neurological issue including paralysis, dural tear, pseudoarthrosis (particularly in longer constructs and in smokers/diabetics), fracture, hardware failure, adjacent segment or junctional breakdown, recurrent laryngeal nerve palsy, Aleksandr's syndrome, dysphagia or dysphonia (These are usually temporary and will resolve within a few weeks to few months timeframe), hematoma requiring evacuation, repeat intubation after surgery, esophageal or tracheal injury, vertebral artery injury (stroke), and the need for possible additional future surgery. We also discussed the possible persistence of symptoms and adjacent segment degeneration. The patient verbalized their understanding and wished to proceed with surgery. Surgical Procedure:   After careful identification patient was taken to the OR, the patient was administered prophylactic antibiotics.   After successful induction of general endotracheal anesthesia, the patient was positioned supine on the operating table with gentle neck extension and securing the arms to the side with gentle traction with shoulder tape. All bony prominences were padded and protected. The neck was then prepped and draped in the usual sterile fashion. A safety timeout was performed identifying the patient by name, MRN, and date of birth; the nature of the procedure, surgical site, and concerns were addressed with the operating room staff. All were in agreement and we proceeded with the procedure. A transverse incision, consistent with the Logan-Jones approach, was made. The skin was incised with a scalpel and subcutaneous dissection was carried out with electrocautery. The subplatysmal membrane was then dissected free. The plane between the deep cervical fascia and the carotid sheath was developed bluntly. The carotid was palpated, protected, and retracted laterally. After exposing the prevertebral fascia, collin clamp was used to identify the disc space. A lateral flouro was then obtained to confirm the appropriate surgical level(s) prior to proceeding with discectomy. The edges of the longus coli muscles elevated from their undersurface with  electrocautery from C3-4. The Trimline retractor of appropriate size was placed. [place] pins were then placed at the most cephalad and caudal vertebral bodies. Mild distraction of the interspace was afforded. A #15 blade was used to create an initial annulotomy. Complete discectomy was complete from uncinate to uncinate with a combination of curettes, pituitary and Kerrison rongeurs. The overhanging osteophyte at the cephalad cervical body was resected and saved for bone grafting. After decompressing the posterior osteophytes and the neuroforamen, the PLL was resected with a #2 Kerrison. A blunt nerve probe can then be passed into the bilateral neural foramen without any resistance.   Next high-speed bur was utilized to decorticate the most lateral aspects of the disc space. Next we trialed and then placed an appropriate sized titanium interbody device packed with local bone graft and allograft into position. Bone graft was then deposited into the disc space on the lateral aspects. At this point the caspar pins were then removed, hemostasis was achieved an appropriate sized plate was selected and applied. Screws were placed and a radiograph obtained to confirm position of the graft and evaluate plate fixation     All bleeders were meticulously controlled using bipolar electrocautery and floseal. The remainder of the floseal was mixed with 40mg of depomedrol and placed over the plate for minimization of dysphagia. 100mg of vancomycin powder was placed in the wound. The wound was thoroughly irrigated at the time of closure. There was no active bleeding. Closure was done in layers with interrupted 2-0 Vicryl for the fascia, 3-0 vicry for subcutaneous incision. The skin was closed with a Monocryl suture. Steri-Strips and a sterile dressing were then applied. The patient was woken from anesthesia and transferred to the PACU in stable condition.                   A physician assistant was necessary during the case to provide positioning, exposure, hemostasis, safety and retraction and to manage wound suction so that I could operate with two hands under the surgical Robert Rodriguez MD  Division of [address] [place] [address]

## 2022-02-16 NOTE — ANESTHESIA PROCEDURE NOTES
Airway  Date/Time: 2/16/2022 7:47 AM  Urgency: elective    Airway not difficult    General Information and Staff    Patient location during procedure: OR  Anesthesiologist: Jess Camara MD  Performed: anesthesiologist     Indications and Patient Condition  Indications for airway management: anesthesia  Spontaneous Ventilation: absent  Sedation level: deep  Preoxygenated: yes  Patient position: sniffing  MILS maintained throughout  Mask difficulty assessment: 1 - vent by mask    Final Airway Details  Final airway type: endotracheal airway      Successful airway: ETT  Cuffed: yes   Successful intubation technique: Video laryngoscopy  Facilitating devices/methods: intubating stylet and cricoid pressure  Endotracheal tube insertion site: oral  Blade: GlideScope  Blade size: #4  ETT size (mm): 7.0    Cormack-Lehane Classification: grade IIA - partial view of glottis (Anterior airway noted on glidescope- not difficult intubation)  Placement verified by: chest auscultation and capnometry   Measured from: lips  Number of attempts at approach: 1

## 2022-02-16 NOTE — ANESTHESIA POSTPROCEDURE EVALUATION
[place] [address] Patient Status:  Inpatient   Age/Gender 74year old female MRN [id number]   Location [address] [facility] Attending Kel Roy MD   Hosp Day # 0 PCP Cortney Kathleen MD       Anesthesia Post-op Note    Anterior Cervical Discectomy Fusion Cervical 3 - Cervical 4    Procedure Summary     Date: 02/16/22 Room / Location: [facility] MAIN OR [address] [place] [address] MAIN OR    Anesthesia Start: 8103 Anesthesia Stop: 1235    Procedures:       Anterior Cervical Discectomy Fusion Cervical 3 - Cervical 4 (N/A Spine Cervical)      INTRAOPERATIVE NEURO MONITORING (N/A Spine Cervical) Diagnosis:       Spondylolisthesis of cervical region      (Spondylolisthesis of cervical region [M43.12])    Surgeons: Kel Roy MD Anesthesiologist: Amirah Larios MD    Anesthesia Type: general ASA Status: 3          Anesthesia Type: general    Vitals Value Taken Time   /90 02/16/22 0952   Temp 97 02/16/22 0953   Pulse 52 02/16/22 0953   Resp 14 02/16/22 0953   SpO2 98 % 02/16/22 0953   Vitals shown include unvalidated device data. Patient Location: PACU    Anesthesia Type: general    Airway Patency: patent and extubated    Postop Pain Control: adequate    Mental Status: mildly sedated but able to meaningfully participate in the post-anesthesia evaluation    Nausea/Vomiting: none    Cardiopulmonary/Hydration status: stable euvolemic    Complications: no apparent anesthesia related complications    Postop vital signs: stable    Dental Exam: Unchanged from Preop    Patient to be discharged from PACU when criteria met.

## 2022-02-17 VITALS
DIASTOLIC BLOOD PRESSURE: 62 MMHG | RESPIRATION RATE: 18 BRPM | SYSTOLIC BLOOD PRESSURE: 123 MMHG | WEIGHT: 224.88 LBS | HEART RATE: 56 BPM | TEMPERATURE: 98 F | OXYGEN SATURATION: 93 % | HEIGHT: 66 IN | BODY MASS INDEX: 36.14 KG/M2

## 2022-02-17 LAB
HCT VFR BLD AUTO: 36.4 %
HGB BLD-MCNC: 12.3 G/DL

## 2022-02-17 PROCEDURE — 97165 OT EVAL LOW COMPLEX 30 MIN: CPT

## 2022-02-17 PROCEDURE — 85018 HEMOGLOBIN: CPT | Performed by: PHYSICIAN ASSISTANT

## 2022-02-17 PROCEDURE — 97116 GAIT TRAINING THERAPY: CPT

## 2022-02-17 PROCEDURE — 97535 SELF CARE MNGMENT TRAINING: CPT

## 2022-02-17 PROCEDURE — 85014 HEMATOCRIT: CPT | Performed by: PHYSICIAN ASSISTANT

## 2022-02-17 PROCEDURE — 97161 PT EVAL LOW COMPLEX 20 MIN: CPT

## 2022-02-17 NOTE — PLAN OF CARE
Patient alert and oriented ,up with standby assist . Vital signs stable . Using CPAP at night . Denies any chest pain or short of breath , o2 sats in the 90's . Valium given for right sided neck pain and stiffness . No difficulty swallowing ,tolerating diet . Dressing to anterior neck clean and dry . Soft collar in place . Encouraged use of incentive spirometer and ankle pumps .  Plan for possible home in am .

## 2022-02-17 NOTE — PLAN OF CARE
Valium given for right side neck and shoulder pain. Denies any numbness or tingling to extremities. Ioban dressing itching patient, okay to switch to Coverlet dressing per Desiree TORRES. Soft collar in place. Voiding up in bathroom. SCD's and teds bilaterally. Vitals stable on room air. Passed dysphagia screening. Home today once seen by hospitalist and OT. Plan of care discussed with patient who agrees.

## 2022-02-17 NOTE — PROGRESS NOTES
Reviewed swallowing precautions. Reviewed indications, side effects of pain medication/narcotics and constipation prevention. Stressed importance of increased fluids/roughage in diet, continued use stool softeners along with laxatives and suppositories as needed while taking narcotics even when at home. Pt verbalized understanding. Teachback done on ankle pumps and incentive spirometry use 10 times every hour w/a for each. Reviewed dressing changes, incision care, showering, restrictions, activity and when to contact surgeon for concerns. Patient already watched discharge education video. Solicited and answered any questions regarding care. Patient verbalized understanding.

## 2022-02-17 NOTE — PROGRESS NOTES
Per Pablo TORRES, patient to resume Xarelto on POD 3. Discharge AVS updated. Discharge video played for patient. Discharge instructions reviewed with patient, all questions answered. IV removed. Cleared by all services. Percocet script filled by [facility]. Zanaflex script given to patient.

## 2022-02-18 NOTE — DISCHARGE SUMMARY
[facility]      Discharge Summary    [address] Patient Status:  Inpatient    1955 MRN [id number]   [facility] [place] 3SW-A Attending No att. providers found   Hosp Day # 1 PCP Eric Lainez MD     Date of Admission: 2022    Date of Discharge: 2022    Admitting Diagnosis: Spondylolisthesis of cervical region [M43.12]  Spondylolisthesis of cervical region    Discharge Diagnosis: Patient Active Problem List:     History of squamous cell carcinoma     Osteoarthritis of multiple joints     GERD (gastroesophageal reflux disease)     Long term (current) use of anticoagulants     History of skin cancer     RADHA on CPAP     DVT, recurrent, lower extremity, chronic     Major depression     Squamous cell carcinoma of skin     Retinal tear of right eye     PVD (posterior vitreous detachment), bilateral     Undifferentiated connective tissue disease ([address]     DVT, recurrent, lower extremity, chronic, unspecified laterality     S/P rotator cuff repair     Dry eyes, bilateral     Cornea guttata     Posterior vitreous detachment, bilateral     Osteoarthritis of multiple joints, unspecified osteoarthritis type     Eye exam due to high risk medication, encounter for     Acromioclavicular joint arthritis     Lacunar stroke ([address]     Foraminal stenosis of cervical region     Posterior neck pain     Headache due to injury of head and neck     Lichen sclerosus et atrophicus of the vulva     Headache, cervicogenic     Cervical spondylosis without myelopathy     History of pulmonary embolism     Hypercoagulable state ([address]     Anxiety     Atopic dermatitis and related condition     Cardiac conduction disorder     Cataract     Chest pain     Palpitations     Psoriasis     Shortness of breath     Systemic lupus erythematosus ([address]     PSVT (paroxysmal supraventricular tachycardia) ([address]     Spondylolisthesis of cervical region     Spondylolisthesis of cervical region      Hospital course:   The patient was admitted on above date to undergo elective surgical intervention understanding risks and benefits to surgery after failing conservative care. Patient underwent   Surgical Procedures     Case IDs Date Procedure Surgeon Location Status    [id number] 2/16/22 Anterior Cervical Discectomy Fusion Cervical 3 - Cervical 4 Flip Masterson MD [facility] MAIN OR [place]        Afterward, pt was brought to the PACU in stable condition. After the recovery room the patient was transferred to the floor using standard protocol orders. Once on the floor the patient was followed by spine service and medical services as well as appropriate ancillary consultations throughout the hospital stay. The patient participated in Physical and Occupational Therapy making steady progressive gains. Once deemed stable by all services the patient was discharged on the above date. Standard discharge instructions were given and they were asked to follow up in clinic in 2 weeks. Disposition: Home or Self Care      Discharge Medications: Discharge Medication List as of 2/17/2022 10:42 AM    START taking these medications    oxyCODONE-acetaminophen 5-325 MG Oral Tab  Take 1 tablet by mouth every 6 (six) hours as needed for Pain., Print Script, Disp-30 tablet, R-0    tiZANidine HCl 4 MG Oral Cap  Take 1 capsule (4 mg total) by mouth 3 (three) times daily. , Print Script, Disp-30 capsule, R-0      CONTINUE these medications which have CHANGED    rivaroxaban (XARELTO) 20 MG Oral Tab  Take 1 tablet (20 mg total) by mouth daily with food., Historical, Disp-90 tablet, R-2      CONTINUE these medications which have NOT CHANGED    atorvastatin 20 MG Oral Tab  Take 20 mg by mouth nightly., Historical    CHOLECALCIFEROL OR  Take 1 capsule by mouth daily. , Historical    metoprolol succinate 50 MG Oral Tablet 24 Hr  Take 50 mg by mouth daily. , Historical    B Complex-C (SUPER B COMPLEX OR)  Take 1 tablet by mouth daily. , Historical    hydroxychloroquine 200 MG Oral Tab  Take 1 tablet (200 mg total) by mouth 2 (two) times daily. , Normal, Disp-180 tablet, R-0    linaCLOtide (LINZESS) 145 MCG Oral Cap  Take 1 capsule by mouth daily. , Normal, Disp-30 capsule, R-2    ALPRAZolam (XANAX) 0.5 MG Oral Tab  Take 1 tablet (0.5 mg total) by mouth as needed for Sleep., Normal, Disp-60 tablet, R-160 tabs should last the patient at least 90 days    Sertraline HCl 50 MG Oral Tab  Take 1.5 tablets (75 mg total) by mouth daily. , Normal, Disp-135 tablet, R-3    TRAZODONE  MG Oral Tab  TAKE 1 TABLET NIGHTLY, Normal, Disp-90 tablet, R-3    omeprazole 20 MG Oral Capsule Delayed Release  Take 1 capsule (20 mg total) by mouth every morning., Normal, Disp-90 capsule, R-3    Azelastine HCl 0.1 % Nasal Solution  1 spray by Nasal route 2 (two) times daily. , Normal, Disp-1 Bottle, R-0    Fluticasone Propionate 50 MCG/ACT Nasal Suspension  2 sprays by Nasal route daily. , Normal, Disp-1 Bottle, R-0    estradiol 0.1 MG/GM Vaginal Cream  Place vaginally daily as needed., Historical    amoxicillin 500 MG Oral Cap  4 capsules 30 minutes before dental procedure., Script not printed, Disp-12 capsule, R-1    aspirin 81 MG Oral Tab  Take 81 mg by mouth daily. , Historical    Acetaminophen 500 MG Oral Cap  Take 500 tablets by mouth every 8 (eight) hours as needed.  With tramadol, Historical      STOP taking these medications    traMADol HCl 50 MG Oral Tab          Anya Tay PA-C  2/18/2022  7:45 AM

## 2024-12-06 LAB — DSDNA AB SER QL CLIF: NEGATIVE

## 2024-12-09 ENCOUNTER — HOSPITAL ENCOUNTER (EMERGENCY)
Age: 69
Discharge: HOME | End: 2024-12-09
Attending: EMERGENCY MEDICINE
Payer: MEDICARE

## 2024-12-09 ENCOUNTER — TELEPHONE (OUTPATIENT)
Dept: NEUROLOGY | Facility: CLINIC | Age: 69
End: 2024-12-09

## 2024-12-09 ENCOUNTER — APPOINTMENT (OUTPATIENT)
Dept: CT IMAGING | Age: 69
End: 2024-12-09
Attending: EMERGENCY MEDICINE
Payer: MEDICARE

## 2024-12-09 ENCOUNTER — APPOINTMENT (OUTPATIENT)
Dept: MRI IMAGING | Age: 69
End: 2024-12-09
Attending: EMERGENCY MEDICINE
Payer: MEDICARE

## 2024-12-09 VITALS
WEIGHT: 220 LBS | HEART RATE: 52 BPM | TEMPERATURE: 98 F | DIASTOLIC BLOOD PRESSURE: 67 MMHG | BODY MASS INDEX: 36 KG/M2 | RESPIRATION RATE: 16 BRPM | OXYGEN SATURATION: 98 % | SYSTOLIC BLOOD PRESSURE: 111 MMHG

## 2024-12-09 DIAGNOSIS — G45.9 TIA DUE TO EMBOLISM (HCC): Primary | ICD-10-CM

## 2024-12-09 DIAGNOSIS — I74.9 TIA DUE TO EMBOLISM (HCC): Primary | ICD-10-CM

## 2024-12-09 LAB
ALBUMIN SERPL-MCNC: 4.5 G/DL (ref 3.2–4.8)
ALBUMIN/GLOB SERPL: 2 {RATIO} (ref 1–2)
ALP LIVER SERPL-CCNC: 51 U/L
ALT SERPL-CCNC: 28 U/L
ANION GAP SERPL CALC-SCNC: 3 MMOL/L (ref 0–18)
AST SERPL-CCNC: 40 U/L (ref ?–34)
ATRIAL RATE: 57 BPM
BASOPHILS # BLD AUTO: 0.04 X10(3) UL (ref 0–0.2)
BASOPHILS NFR BLD AUTO: 0.9 %
BILIRUB SERPL-MCNC: 0.5 MG/DL (ref 0.2–1.1)
BUN BLD-MCNC: 10 MG/DL (ref 9–23)
CALCIUM BLD-MCNC: 9.6 MG/DL (ref 8.7–10.4)
CHLORIDE SERPL-SCNC: 109 MMOL/L (ref 98–112)
CO2 SERPL-SCNC: 25 MMOL/L (ref 21–32)
CREAT BLD-MCNC: 0.88 MG/DL
EGFRCR SERPLBLD CKD-EPI 2021: 70 ML/MIN/1.73M2 (ref 60–?)
EOSINOPHIL # BLD AUTO: 0.11 X10(3) UL (ref 0–0.7)
EOSINOPHIL NFR BLD AUTO: 2.5 %
ERYTHROCYTE [DISTWIDTH] IN BLOOD BY AUTOMATED COUNT: 13.5 %
GLOBULIN PLAS-MCNC: 2.3 G/DL (ref 2–3.5)
GLUCOSE BLD-MCNC: 104 MG/DL (ref 70–99)
GLUCOSE BLD-MCNC: 98 MG/DL (ref 70–99)
HCT VFR BLD AUTO: 37.2 %
HGB BLD-MCNC: 12.9 G/DL
IMM GRANULOCYTES # BLD AUTO: 0.01 X10(3) UL (ref 0–1)
IMM GRANULOCYTES NFR BLD: 0.2 %
LYMPHOCYTES # BLD AUTO: 1.22 X10(3) UL (ref 1–4)
LYMPHOCYTES NFR BLD AUTO: 28.2 %
MCH RBC QN AUTO: 32.7 PG (ref 26–34)
MCHC RBC AUTO-ENTMCNC: 34.7 G/DL (ref 31–37)
MCV RBC AUTO: 94.2 FL
MONOCYTES # BLD AUTO: 0.45 X10(3) UL (ref 0.1–1)
MONOCYTES NFR BLD AUTO: 10.4 %
NEUTROPHILS # BLD AUTO: 2.49 X10 (3) UL (ref 1.5–7.7)
NEUTROPHILS # BLD AUTO: 2.49 X10(3) UL (ref 1.5–7.7)
NEUTROPHILS NFR BLD AUTO: 57.8 %
OSMOLALITY SERPL CALC.SUM OF ELEC: 283 MOSM/KG (ref 275–295)
P AXIS: 28 DEGREES
P-R INTERVAL: 184 MS
PLATELET # BLD AUTO: 261 10(3)UL (ref 150–450)
POTASSIUM SERPL-SCNC: 4.5 MMOL/L (ref 3.5–5.1)
PROT SERPL-MCNC: 6.8 G/DL (ref 5.7–8.2)
Q-T INTERVAL: 448 MS
QRS DURATION: 92 MS
QTC CALCULATION (BEZET): 436 MS
R AXIS: -46 DEGREES
RBC # BLD AUTO: 3.95 X10(6)UL
SODIUM SERPL-SCNC: 137 MMOL/L (ref 136–145)
T AXIS: 15 DEGREES
VENTRICULAR RATE: 57 BPM
WBC # BLD AUTO: 4.3 X10(3) UL (ref 4–11)

## 2024-12-09 PROCEDURE — 99285 EMERGENCY DEPT VISIT HI MDM: CPT

## 2024-12-09 PROCEDURE — 36415 COLL VENOUS BLD VENIPUNCTURE: CPT

## 2024-12-09 PROCEDURE — 70549 MR ANGIOGRAPH NECK W/O&W/DYE: CPT | Performed by: EMERGENCY MEDICINE

## 2024-12-09 PROCEDURE — 70546 MR ANGIOGRAPH HEAD W/O&W/DYE: CPT | Performed by: EMERGENCY MEDICINE

## 2024-12-09 PROCEDURE — 99284 EMERGENCY DEPT VISIT MOD MDM: CPT

## 2024-12-09 PROCEDURE — 82962 GLUCOSE BLOOD TEST: CPT

## 2024-12-09 PROCEDURE — 70553 MRI BRAIN STEM W/O & W/DYE: CPT | Performed by: EMERGENCY MEDICINE

## 2024-12-09 PROCEDURE — 93010 ELECTROCARDIOGRAM REPORT: CPT

## 2024-12-09 PROCEDURE — 85025 COMPLETE CBC W/AUTO DIFF WBC: CPT | Performed by: EMERGENCY MEDICINE

## 2024-12-09 PROCEDURE — 80053 COMPREHEN METABOLIC PANEL: CPT | Performed by: EMERGENCY MEDICINE

## 2024-12-09 PROCEDURE — 70450 CT HEAD/BRAIN W/O DYE: CPT | Performed by: EMERGENCY MEDICINE

## 2024-12-09 PROCEDURE — A9575 INJ GADOTERATE MEGLUMI 0.1ML: HCPCS | Performed by: EMERGENCY MEDICINE

## 2024-12-09 PROCEDURE — 93005 ELECTROCARDIOGRAM TRACING: CPT

## 2024-12-09 RX ORDER — GADOTERATE MEGLUMINE 376.9 MG/ML
20 INJECTION INTRAVENOUS
Status: COMPLETED | OUTPATIENT
Start: 2024-12-09 | End: 2024-12-09

## 2024-12-09 NOTE — ED PROVIDER NOTES
Patient Seen in: [facility] [place]      History     Chief Complaint   Patient presents with    Stroke     Stated Complaint: states is having trouble processing her thoughts onset this am. no other syxmpt*    Subjective:   HPI      Patient is a 69-year-old woman on Xarelto with a history of previous stroke who presents with intermittent confusion.  Says she is a difficult time processing her thoughts for the last 2 days.  It seems worse this morning.  No focal numbness or weakness.  Said similar symptoms with a previously diagnosed stroke by MRI.  She has a mild diffuse headache.  She is here with her daughter.  No other specific complaints.  Patient is otherwise at her medical baseline.    Objective:     Past Medical History:    Anxiety state, unspecified    Arrhythmia    Arthritis    Back pain    Bloating    Breast injury    pt had a fall... bruised RT breast.. affected entire breast    Cervical spinal stenosis    C5-6, C6-7    Constipation    Coronary artery disease    mid distal LAD 70-80%    Decorative tattoo    Depression    Diarrhea, unspecified    DM type 2 (diabetes mellitus, type 2) ([facility])    DVT (deep venous thrombosis) ([facility])    ONCE WHILE CASTED AND AGAIN AFTER KNEE REPLACMENT    Easy bruising    Eczema    Esophageal reflux    Fatigue    Flatulence/gas pain/belching    Frequent urination    Frequent use of laxatives    Frequent UTI    Headache disorder    Heart palpitations    Heartburn    Hemorrhoids    Herpes zoster    High cholesterol    History of COVID-19    cold symptoms- runny nose, low grade fever, cough, fatigue- symptoms improving in 1 week, not hospitalized    History of depression    History of lacunar cerebrovascular accident (CVA)    had word finding difficulties; posterior R frontal lobe    Indigestion    Irregular bowel habits    Itch of skin    Leaking of urine    Malignant hyperthermia    no issues w/ patient but 2 kids tested positive    Mouth sores    canker  sores    Night sweats    Osteoarthrosis, unspecified whether generalized or localized, unspecified site    Pain in joints    Painful urination    PULMONARY EMBOLISM    Rash    Reflux    Retinal tear    Shortness of breath    Skin blushing/flushing    Sleep disturbance    Squamous cell skin cancer    Stool incontinence    Stroke (HCC)    no residual effects    Ulcer    Undifferentiated connective tissue disease (HCC)    Unspecified sleep apnea    AHI 44 RDI 44 REM AHI 75 saO2 xavier 74 %  CPAP 9  [facility]ier    Vulvar dystrophy    Wears glasses              Past Surgical History:   Procedure Laterality Date    Colonoscopy      2005; hemorrhoids    Colonoscopy  8/8/2013    Procedure: COLONOSCOPY;  Surgeon: Salvatore Lee MD;  [facility]    Colonoscopy N/A 1/21/2022    Procedure: COLONOSCOPY;  Surgeon: Miguel Chavez DO;  Location:  ENDOSCOPY    Excis vaginal cyst/tumor      2004    Foot/toes surgery proc unlisted  2004    benitez Fx (R)    Foot/toes surgery proc unlisted  20 years ago    excision of neuroma 1st IS (R)    Hallux rigidus correction  7/16/2012    Procedure: MODIFIED BLANCA;  Surgeon: Edwin Ryder DPM;  Location: [facility]    Hysterectomy      Knee replacement surgery      bilateral    Open repair lunate dislocation Left     Other      nerve block and radiofrequency ablation    Revise ulnar nerve at elbow      left  decompression  2002    Shoulder arthroscopy Right 8-4-16    Dr. Jeong    Sigmoidoscopy,diagnostic  1990    Skin surgery      squamous cell chest  2008    Sling oper stres incontinence  2018    Total abdom hysterectomy      Tubal ligation      Upper gi endoscopy - referral      REFLUX ESOPHAGITIS, NON-OBS SCHATZKI'S RING, GASTRITIS    Upper gi endoscopy,biopsy  8/30/2012    Procedure: ESOPHAGOGASTRODUODENOSCOPY, POSSIBLE BIOPSY, POSSIBLE POLYPECTOMY 69504;  Surgeon: Salvatore Lee MD;  Location: [facility]    Upper gi endoscopy,biopsy  5/23/2013     Procedure: ESOPHAGOGASTRODUODENOSCOPY, POSSIBLE BIOPSY, POSSIBLE POLYPECTOMY 50131;  Surgeon: Salvatore Lee MD;  Location: [facility]    Upper gi endoscopy,biopsy N/A 3/25/2015    Procedure: ESOPHAGOGASTRODUODENOSCOPY, POSSIBLE BIOPSY, POSSIBLE POLYPECTOMY 76263;  Surgeon: Salvatore Lee MD;  Location: [facility]                Social History     Socioeconomic History    Marital status:    Tobacco Use    Smoking status: Never    Smokeless tobacco: Never   Vaping Use    Vaping status: Never Used   Substance and Sexual Activity    Alcohol use: Yes     Comment: rarely    Drug use: No    Sexual activity: Not Currently     Partners: Male   Other Topics Concern     Service No    Blood Transfusions No    Caffeine Concern No     Comment: 2 cups of coffee daily.     Occupational Exposure No    Hobby Hazards No    Sleep Concern Yes     Comment: sleep apnea, on CPAP    Weight Concern Yes    Back Care No    Exercise No    Seat Belt Yes    Self-Exams Yes                  Physical Exam     ED Triage Vitals [12/09/24 1133]   /88   Pulse 57   Resp 10   Temp 98 °F (36.7 °C)   Temp src Oral   SpO2 97 %   O2 Device None (Room air)       Current Vitals:   Vital Signs  BP: 129/70  Pulse: 50  Resp: 16  Temp: 98 °F (36.7 °C)  Temp src: Oral    Oxygen Therapy  SpO2: 95 %  O2 Device: None (Room air)        Physical Exam  General: Patient is resting comfortably in no acute distress  HEENT: Normal cephalic atraumatic.  Nonicteric sclera.  Moist mucous membranes.  No meningismus.  No adenopathy  Lungs: No tachypnea.  Lungs clear to auscultation bilaterally without rales/rhonchi.  Equal breath sounds bilaterally  Cardiac: No tachycardia.  No murmurs.  Regular rate and rhythm.  Abdomen: Soft and nontender throughout.  No rebound or guarding  Extremities: No clubbing/cyanosis/edema.  Skin: No rashes, no pallor  Neuro: Awake oriented ×3.  Nonfocal.  Good strength throughout.  Conversational  speech.    ED Course     Labs Reviewed   COMP METABOLIC PANEL (14) - Abnormal; Notable for the following components:       Result Value    AST 40 (*)     Alkaline Phosphatase 51 (*)     All other components within normal limits   POCT GLUCOSE - Abnormal; Notable for the following components:    POC Glucose 104 (*)     All other components within normal limits   CBC WITH DIFFERENTIAL WITH PLATELET   RAINBOW DRAW LAVENDER   RAINBOW DRAW LIGHT GREEN   RAINBOW DRAW BLUE     EKG    Rate, intervals and axes as noted on EKG Report.  Rate: 57  Rhythm: Sinus Rhythm  Reading: Normal sinus rhythm.  Left anterior fascicular block.  Nonspecific ST-T wave changes.  Axis/intervals are noted but otherwise, agree with EKG report                CT brain: I personally reviewed the films and my independent interpertaion showed no acute hemorrhage.  Official report reviewed and normal.    MRA MRI of the head and neck: No no acute intracranial process.  Unremarkable MRA.  Unremarkable MRI of the neck.  No evidence of stroke.       MDM      Patient is a pleasant 69-year-old female presents with difficulty processing her thoughts intermittent confusion over the last 2 days.  She is aware of the sensation.  Differential includes subacute stroke, bleed, other.  Patient is not a tenecteplase candidate given her timeframe and the fact that she takes Xarelto.  Patient is not a candidate for intervention given her timeframe and her NIH stroke scale.  Will discuss with neurology.  Will proceed to head CT to rule out bleed.  If negative will proceed to MRI/MRA of the head and neck.  Will discuss with neurology after those results.    MRI MRI negative.  Discussed with patient, unclear etiology to her symptoms.  TIA, seizure, other on the differential but will follow-up with the TIA clinic.  Will continue her Eliquis, statin for now.  Return if worsening symptoms or new complaints.    Medical Decision Making      Disposition and Plan     Clinical  Impression:  1. TIA due to embolism (HCC)         Disposition:  Discharge  12/9/2024  3:04 pm    Follow-up:  Lanie Garrison MD  [address] [facility] [id number] [phone]    Follow up in 1 week(s)      [address] [place] [place] [id number] [phone]  Call  choose option 1 for general neurology and state that you are following up for TIA          Medications Prescribed:  Current Discharge Medication List              Supplementary Documentation:

## 2024-12-09 NOTE — DISCHARGE INSTRUCTIONS
Possible TIA.  Will follow-up with the TIA clinic.  Continue your Eliquis as well as your statin.  Return if worsening symptoms or new complaints.  Return if fevers, headache, vomiting.

## 2024-12-09 NOTE — TELEPHONE ENCOUNTER
TIA CLINIC SCREENING    Situation  Date of ED visit/TIA diagnosis: 12/09/2024    Time of discharge from ED: 1518    Is patient currently admitted?  No If YES - TIA Clinic Appointment not required.    Delete Background and Assessment sections and skip to Recommendation.     Background  Does patient already see an [facility] neurologist? No  Name:  If YES - TIA Clinic Appointment not required.    Route completed message on to patient's neurologist for follow up recommendation.       Assessment  Patient's current anti-platelet therapy: Eliquis   Patient's current statin therapy: atorvastatin   Has 2D Echo with bubble test been done? No  Date:      Recommendation  Is TIA Clinic Appointment indicated?  Yes   If YES Contact patient to schedule appointment NO LATER THAN 48 HOURS AFTER ED DISCHARGE.    If message left for patient, document message and route encounter to [facility] to follow up.  If patient declines appointment within 24-48 hours, document that and find an appointment suitable to patient's schedule.  If patient declines appointment, document that and reason for decline.   If UNSURE  Route encounter to clinic provider for recommendation.   If NO  indicate reason and sign encounter.       TIA APPOINTMENT STATUS: Patient appointment scheduled on 12/10/2024 at 9:40 am with Dr. Porras in [place].

## 2024-12-10 ENCOUNTER — OFFICE VISIT (OUTPATIENT)
Dept: NEUROLOGY | Facility: CLINIC | Age: 69
End: 2024-12-10
Payer: MEDICARE

## 2024-12-10 VITALS
DIASTOLIC BLOOD PRESSURE: 68 MMHG | WEIGHT: 219.38 LBS | HEART RATE: 73 BPM | SYSTOLIC BLOOD PRESSURE: 119 MMHG | RESPIRATION RATE: 16 BRPM | BODY MASS INDEX: 35 KG/M2

## 2024-12-10 DIAGNOSIS — R41.0 TRANSIENT CONFUSION: Primary | ICD-10-CM

## 2024-12-10 RX ORDER — UPADACITINIB 15 MG/1
TABLET, EXTENDED RELEASE ORAL
COMMUNITY

## 2024-12-10 NOTE — PROGRESS NOTES
ER visit on 12/10/2024, TIA. Pt reports she had difficulty with memory, \"couldn't keep a thought\".     Pt reports 5-7 years ago she had a small stroke.  Pt reports she had an EEG that was read by alternate neurologist and she had started ASMs, was taken off.

## 2024-12-10 NOTE — PATIENT INSTRUCTIONS
After your visit at the [facility] today,  please direct any follow up questions or medication needs to the staff in our [place] office so that your concerns may be promptly addressed.  We are available through [email or web address] or at the numbers below:    The phone number is:   [phone] option #1    The fax number is:  [phone]    Your pharmacy should also send any requests electronically to the [place] office.  Refill policies:    Allow 2-3 business days for refills; controlled substances may take longer.  Contact your pharmacy at least 5 days prior to running out of medication and have them send an electronic request or submit request through the “request refill” option in your [employer] account.  Refills are not addressed on weekends; covering physicians do not authorize routine medications on weekends.  No narcotics or controlled substances are refilled after noon on Fridays or by on call physicians.  By law, narcotics must be electronically prescribed.  A 30 day supply with no refills is the maximum allowed.  If your prescription is due for a refill, you may be due for a follow up appointment.  To best provide you care, patients receiving routine medications need to be seen at least once a year.  Patients receiving narcotic/controlled substance medications need to be seen at least once every 3 months.  In the event that your preferred pharmacy does not have the requested medication in stock (e.g. Backordered), it is your responsibility to find another pharmacy that has the requested medication available.  We will gladly send a new prescription to that pharmacy at your request.    Scheduling Tests:    If your physician has ordered radiology tests such as MRI or CT scans, please contact Central Scheduling at [phone] right away to schedule the test.  Once scheduled, the [facility] Centralized Referral Team will work with your insurance carrier to obtain pre-certification or prior authorization.   Depending on your insurance carrier, approval may take 3-10 days.  It is highly recommended patients assure they have received an authorization before having a test performed.  If test is done without insurance authorization, patient may be responsible for the entire amount billed.      Precertification and Prior Authorizations:  If your physician has recommended that you have a procedure or additional testing performed the [facility] Centralized Referral Team will contact your insurance carrier to obtain pre-certification or prior authorization.    You are strongly encouraged to contact your insurance carrier to verify that your procedure/test has been approved and is a COVERED benefit.  Although the [facility] Centralized Referral Team does its due diligence, the insurance carrier gives the disclaimer that \"Although the procedure is authorized, this does not guarantee payment.\"    Ultimately the patient is responsible for payment.   Thank you for your understanding in this matter.  Paperwork Completion:  If you require FMLA or disability paperwork for your recovery, please make sure to either drop it off or have it faxed to our office at [phone]. Be sure the form has your name and date of birth on it.  The form will be faxed to our Forms Department and they will complete it for you.  There is a 25$ fee for all forms that need to be filled out.  Please be aware there is a 10-14 day turnaround time.  You will need to sign a release of information (GURPREET) form if your paperwork does not come with one.  You may call the Forms Department with any questions at [phone].  Their fax number is [phone].

## 2024-12-10 NOTE — H&P
Neurology H&P    Marlena Dacosta Patient Status:  No patient class for patient encounter    1955 MRN [id number]   Location [facility], [address] [place] [address] Attending No att. providers found   Hosp Day # 0 PCP Lanie Garrison MD     Subjective:  Marlena Dacosta is a(n) 69 year old female past medical history significant for OA, DVT and PE currently on AC, HL, depression, anxiety, and comes to neurology clinic for transient episode of confusion. She states that she has been having more frequent episodes of confusion. She states that she is currently seeing another neurologist Dr. Mayo for a h/o a small R frontal lacunar infarct. She is already on Eliquis and atorvastatin. She states that what sent her to the ED yesterday was that she was at the store and then suddenly could not remember why she went to the store. She had no numbness, weakness, tingling or slurred speech. She had no facial weakness. She was able to drive herself home. She states that she had no motor deficits and that her symptoms were very subtle. She called her daughter and had her drive to the ED. She states that she has been under more stress. These same symptoms have occurred in the past but  not as severe as yesterday. She states that these symptoms occur pretty regularly for her. She states that her primary neurologist has done EEGs and also ordered a cognitive tests which were normal. She was placed on a ASM at one point but taken off of this by her primary neurologist.  That these events have never had any sort of clear etiology.  She is under more stress.  She states that she was in a constant state of stress.  She feels fine today and has no symptoms or complaints.    Current Medications:  Current Outpatient Medications   Medication Sig Dispense Refill    apixaban (ELIQUIS) 5 MG Oral Tab Take 1 tablet (5 mg total) by mouth 2 (two) times daily.      Upadacitinib ER (RINVOQ) 15 MG Oral Tablet 24 Hr Take by  mouth.      hydroxychloroquine 200 MG Oral Tab Take 1 tablet (200 mg total) by mouth 2 (two) times daily. 180 tablet 0    atorvastatin 20 MG Oral Tab Take 1 tablet (20 mg total) by mouth nightly. 90 tablet 3    CHOLECALCIFEROL OR Take 1 capsule by mouth daily.      metoprolol succinate 50 MG Oral Tablet 24 Hr Take 0.5 tablets (25 mg total) by mouth daily.      B Complex-C (SUPER B COMPLEX OR) Take 1 tablet by mouth daily.      ALPRAZolam (XANAX) 0.5 MG Oral Tab Take 1 tablet (0.5 mg total) by mouth as needed for Sleep. 60 tablet 1    Fluticasone Propionate 50 MCG/ACT Nasal Suspension 2 sprays by Nasal route daily. 1 Bottle 0    estradiol 0.1 MG/GM Vaginal Cream Place vaginally daily as needed.      Acetaminophen 500 MG Oral Cap Take 500 tablets by mouth every 8 (eight) hours as needed. With tramadol      traMADol 50 MG Oral Tab TAKE 1 TABLET EVERY 8 HOURSAS NEEDED FOR PAIN. 60 tablet 1    omeprazole 20 MG Oral Capsule Delayed Release Take 1 capsule (20 mg total) by mouth every morning. 90 capsule 3    linaCLOtide (LINZESS) 145 MCG Oral Cap Take 1 capsule by mouth daily. (Patient not taking: Reported on 12/10/2024) 30 capsule 2    Sertraline HCl 50 MG Oral Tab Take 1.5 tablets (75 mg total) by mouth daily. 135 tablet 3    TRAZODONE  MG Oral Tab TAKE 1 TABLET NIGHTLY 90 tablet 3    Azelastine HCl 0.1 % Nasal Solution 1 spray by Nasal route 2 (two) times daily. (Patient not taking: Reported on 12/10/2024) 1 Bottle 0       Problem List:  Patient Active Problem List   Diagnosis    History of squamous cell carcinoma    Osteoarthritis of multiple joints    GERD (gastroesophageal reflux disease)    Long term (current) use of anticoagulants    History of skin cancer    RADHA on CPAP    DVT, recurrent, lower extremity, chronic    Major depression    Squamous cell carcinoma of skin    Retinal tear of right eye    PVD (posterior vitreous detachment), bilateral    Undifferentiated connective tissue disease (HCC)    DVT,  recurrent, lower extremity, chronic, unspecified laterality    S/P rotator cuff repair    Dry eyes, bilateral    Cornea guttata    Posterior vitreous detachment, bilateral    Osteoarthritis of multiple joints, unspecified osteoarthritis type    Eye exam due to high risk medication, encounter for    Acromioclavicular joint arthritis    Lacunar stroke ([facility])    Foraminal stenosis of cervical region    Posterior neck pain    Headache due to injury of head and neck    Lichen sclerosus et atrophicus of the vulva    Headache, cervicogenic    Cervical spondylosis without myelopathy    History of pulmonary embolism    Hypercoagulable state ([facility])    Anxiety    Atopic dermatitis and related condition    Cardiac conduction disorder    Cataract    Chest pain    Palpitations    Psoriasis    Shortness of breath    Systemic lupus erythematosus ([facility])    PSVT (paroxysmal supraventricular tachycardia) ([facility])    Spondylolisthesis of cervical region    Spondylolisthesis of cervical region       PMHx:  Past Medical History:    Anxiety state, unspecified    Arrhythmia    Arthritis    Back pain    Bloating    Breast injury    pt had a fall... bruised RT breast.. affected entire breast    Cervical spinal stenosis    C5-6, C6-7    Constipation    Coronary artery disease    mid distal LAD 70-80%    Decorative tattoo    Depression    Diarrhea, unspecified    DM type 2 (diabetes mellitus, type 2) ([facility])    DVT (deep venous thrombosis) ([facility])    ONCE WHILE CASTED AND AGAIN AFTER KNEE REPLACMENT    Easy bruising    Eczema    Esophageal reflux    Fatigue    Flatulence/gas pain/belching    Frequent urination    Frequent use of laxatives    Frequent UTI    Headache disorder    Heart palpitations    Heartburn    Hemorrhoids    Herpes zoster    High cholesterol    History of COVID-19    cold symptoms- runny nose, low grade fever, cough, fatigue- symptoms improving in 1 week, not hospitalized    History of depression    History of lacunar cerebrovascular  accident (CVA)    had word finding difficulties; posterior R frontal lobe    Indigestion    Irregular bowel habits    Itch of skin    Leaking of urine    Malignant hyperthermia    no issues w/ patient but 2 kids tested positive    Mouth sores    canker sores    Night sweats    Osteoarthrosis, unspecified whether generalized or localized, unspecified site    Pain in joints    Painful urination    PULMONARY EMBOLISM    Rash    Reflux    Retinal tear    Shortness of breath    Skin blushing/flushing    Sleep disturbance    Squamous cell skin cancer    Stool incontinence    Stroke (HCC)    no residual effects    Ulcer    Undifferentiated connective tissue disease (HCC)    Unspecified sleep apnea    AHI 44 RDI 44 REM AHI 75 saO2 xavier 74 %  CPAP 9  Premier    Vulvar dystrophy    Wears glasses       PSHx:  Past Surgical History:   Procedure Laterality Date    Colonoscopy      2005; hemorrhoids    Colonoscopy  8/8/2013    Procedure: COLONOSCOPY;  Surgeon: Salvatore Lee MD;  Location:  ENDOSCOPY    Colonoscopy N/A 1/21/2022    Procedure: COLONOSCOPY;  Surgeon: Miguel Chavez DO;  Location:  ENDOSCOPY    Excis vaginal cyst/tumor      2004    Foot/toes surgery proc unlisted  2004    benitez Fx (R)    Foot/toes surgery proc unlisted  20 years ago    excision of neuroma 1st IS (R)    Hallux rigidus correction  7/16/2012    Procedure: MODIFIED BLANCA;  Surgeon: Edwin Ryder DPM;  [facility]    Hysterectomy      Knee replacement surgery      bilateral    Open repair lunate dislocation Left     Other      nerve block and radiofrequency ablation    Revise ulnar nerve at elbow      left  decompression  2002    Shoulder arthroscopy Right 8-4-16    Dr. Jeong    Sigmoidoscopy,diagnostic  1990    Skin surgery      squamous cell chest  2008    Sling oper stres incontinence  2018    Total abdom hysterectomy      Tubal ligation      Upper gi endoscopy - referral      REFLUX ESOPHAGITIS, NON-OBS  SCHATZKI'S RING, GASTRITIS    Upper gi endoscopy,biopsy  8/30/2012    Procedure: ESOPHAGOGASTRODUODENOSCOPY, POSSIBLE BIOPSY, POSSIBLE POLYPECTOMY 07396;  Surgeon: Salvatore Lee MD;  Location: [facility]    Upper gi endoscopy,biopsy  5/23/2013    Procedure: ESOPHAGOGASTRODUODENOSCOPY, POSSIBLE BIOPSY, POSSIBLE POLYPECTOMY 05783;  Surgeon: Salvatore Lee MD;  Location: [facility]    Upper gi endoscopy,biopsy N/A 3/25/2015    Procedure: ESOPHAGOGASTRODUODENOSCOPY, POSSIBLE BIOPSY, POSSIBLE POLYPECTOMY 04530;  Surgeon: Salvatore Lee MD;  Location: [facility]       SocHx:  Social History     Socioeconomic History    Marital status:    Tobacco Use    Smoking status: Never    Smokeless tobacco: Never   Vaping Use    Vaping status: Never Used   Substance and Sexual Activity    Alcohol use: Yes     Comment: rarely    Drug use: Yes     Frequency: 3.0 times per week     Types: Cannabis     Comment: 2-3 times per week    Sexual activity: Not Currently     Partners: Male   Other Topics Concern     Service No    Blood Transfusions No    Caffeine Concern Yes     Comment: 2 cups of coffee daily.     Occupational Exposure No    Hobby Hazards No    Sleep Concern Yes     Comment: sleep apnea, on CPAP    Weight Concern Yes    Back Care No    Exercise No    Seat Belt Yes    Self-Exams Yes       Family History:  Family History   Problem Relation Age of Onset    Heart Disorder Father     Cancer Father         brain tumors    Stroke Father     Other (Other) Father         CVA    Other (hammer toe) Father     Alcohol and Other Disorders Associated Father     Heart Attack Father     Heart Disorder Mother     Diabetes Mother     Arthritis Mother         OA    Other (Other) Sister         hypothryoidism, Crohn's, RA    Other (Other) Brother         CP    Alcohol and Other Disorders Associated Brother     Other (Other) Sister         RA    Crohn's Disease Sister     Hypertension Sister      Other (bunion) Sister     Alcohol and Other Disorders Associated Sister     Hypertension Sister     Alcohol and Other Disorders Associated Brother     Alcohol and Other Disorders Associated Daughter     Mental Disorder Paternal Aunt            ROS:  10 point ROS completed and was negative, except for pertinent positive and negatives stated in subjective.    Objective/Physical Exam:    Vital Signs:  Blood pressure 119/68, pulse 73, resp. rate 16, weight 219 lb 5.7 oz (99.5 kg), not currently breastfeeding.    Gen: Awake and in no apparent distress  HEENT: moist mucus membranes  Neck: Supple  Cardiovascular: Regular rate and rhythm, no murmur  Pulm: CTAB  GI: non-tender, normal bowel sounds  Skin: normal, dry  Extremities: No clubbing or cyanosis      Neurologic:   MENTAL STATUS: alert, ox3, normal attention, language and fund of knowledge.      CRANIAL NERVES II to XII: PERRLA, no ptosis or diplopia, EOM intact, facial sensation intact, strong eye closure, face is symmetric, no dysarthria, tongue midline,  no tongue fasciculations or atrophy, strong shoulder shrug.    MOTOR EXAMINATION: normal tone, no fasciculations, normal strength throughout in UEs and LEs      SENSORY EXAMINATION:  UE: intact to light touch, pinprick intact  LE: intact to light touch, pinprick intact    COORDINATION:  No dysmetria, or intention tremors     REFLEXES: 2+ at biceps, 2+ brachioradialis, 2+ at patella    GAIT: normal stance, normal gait        Labs:       Imaging:  MRI/MRA brain 12/9/24  CONCLUSION:       1. No acute intracranial process.      2. Unremarkable MRA of the sohawk-yo-Xtpler.       3. Unremarkable MRA of the neck.       Assessment:  This is a 70 y/o female with transient episodes of confusion.  She has a normal nonfocal neurologic exam today.  I did review the MRI and MRA of the brain that was done yesterday in the emergency room.  They are grossly unremarkable.  She states that she has a history of a small lacunar stroke.   Per chart review she did have a subtle diffusion restriction in the right frontal lobe to thousand 17.  I see no evidence of any microvascular disease or stroke at this time an MRI of the brain.  I do not believe that this transient episode of confusion was TIA.  She states that they occur regularly.  They be occurring regularly for quite some time.  She has a primary neurologist Dr. Mayo who she states is order cognitive testing plus brain imaging and EEGs.  No clear etiology ever found.  She is actually been on ASM's in the past but was taken off of these as there is no clear evidence of seizure.  I have no further testing to order at this point in time.  She can follow-up with her primary neurologist.  She is already on Eliquis and a statin for stroke and TIA prevention.  Her blood pressure is good.  I did offer repeat cognitive testing and EEG which she declines.  Possibly this is stress related transient confusion or even mild cognitive impairment which is developing, though seizures cannot be completely ruled out at this time.      Plan:  Transient confusion  - MRI brain and MRA brain reviewed  - Continue AC and statin  - Declined any EEG evaluation  - Can follow up with her primary neurologist     Norris Porras, DO  Neurology

## 2025-01-02 LAB — ALDOLASE SERPL-CCNC: 4.4 U/L

## 2025-04-01 NOTE — ED QUICK NOTES
Post gastric bypass. Uncontrolled. Therapeutic lifestyle interventions. May consider med management after optimization of GERD    The [place] was dc'd with the tip intact. DC instr re TIA were given to the pt and her daughter. To f/u with her neurologist and to return to nearest ER if worse. They expressed an understanding and was dc'd home,in nad.

## 2025-04-05 LAB — DSDNA AB SER QL CLIF: NEGATIVE

## (undated) DEVICE — LIGHT HANDLE

## (undated) DEVICE — MEDI-VAC SUCTION HANDLE REGULAR CAPACITY: Brand: CARDINAL HEALTH

## (undated) DEVICE — TIP BOVIE 4" MEGADYNE

## (undated) DEVICE — SOL H2O IV

## (undated) DEVICE — REM POLYHESIVE ADULT PATIENT RETURN ELECTRODE: Brand: VALLEYLAB

## (undated) DEVICE — PROXIMATE SKIN STAPLERS (35 WIDE) CONTAINS 35 STAINLESS STEEL STAPLES (FIXED HEAD): Brand: PROXIMATE

## (undated) DEVICE — #15 STERILE STAINLESS BLADE: Brand: STERILE STAINLESS BLADES

## (undated) DEVICE — VIOLET BRAIDED (POLYGLACTIN 910), SYNTHETIC ABSORBABLE SUTURE: Brand: COATED VICRYL

## (undated) DEVICE — GLOVE SURG TRIUMPH SZ 6-1/2

## (undated) DEVICE — GYN CDS: Brand: MEDLINE INDUSTRIES, INC.

## (undated) DEVICE — SUTURE VICRYL 2-0 FS-1

## (undated) DEVICE — SOL  .9 1000ML BTL

## (undated) DEVICE — STANDARD HYPODERMIC NEEDLE,POLYPROPYLENE HUB: Brand: MONOJECT

## (undated) DEVICE — GOWN SURG AERO CHROME XXL

## (undated) DEVICE — 11.1-M5 MULTIMODALITY KIT 5

## (undated) DEVICE — SUTURE VICRYL 3-0 SH

## (undated) DEVICE — 3.0MM PRECISION NEURO (MATCH HEAD)

## (undated) DEVICE — 1200CC GUARDIAN II: Brand: GUARDIAN

## (undated) DEVICE — PAD SACRAL SPAN AID

## (undated) DEVICE — STERILE POLYISOPRENE POWDER-FREE SURGICAL GLOVES: Brand: PROTEXIS

## (undated) DEVICE — MEDI-VAC NON-CONDUCTIVE SUCTION TUBING: Brand: CARDINAL HEALTH

## (undated) DEVICE — SPONGE RAYTEC 4X4 RF DETECT

## (undated) DEVICE — GLOVE SURG SENSICARE SZ 6-1/2

## (undated) DEVICE — SUTURE VICRYL 2-0 CT-1

## (undated) DEVICE — FILTERLINE NASAL ADULT O2/CO2

## (undated) DEVICE — C-ARM: Brand: UNBRANDED

## (undated) DEVICE — RETRACTOR LONE STAR STAYS LG

## (undated) DEVICE — ENDOSCOPY PACK - LOWER: Brand: MEDLINE INDUSTRIES, INC.

## (undated) DEVICE — SUTURE MONOCRYL 3-0 PS-2

## (undated) DEVICE — KENDALL SCD EXPRESS SLEEVES, KNEE LENGTH, MEDIUM: Brand: KENDALL SCD

## (undated) DEVICE — LAMINECTOMY CDS: Brand: MEDLINE INDUSTRIES, INC.

## (undated) DEVICE — DRAPE TABLE COVER 44X90 TC-10

## (undated) DEVICE — FLOSEAL HEMOSTATIC MATRIX, 5ML: Brand: FLOSEAL HEMOSTATIC MATRIX

## (undated) DEVICE — MARKER SKIN PREP RESIST STRL

## (undated) DEVICE — SCD SLEEVE KNEE HI BLEND

## (undated) DEVICE — SUTURE VICRYL 0 CT-1

## (undated) DEVICE — DERMABOND LIQUID ADHESIVE

## (undated) DEVICE — LO CONTOUR COLLAR: Brand: DEROYAL

## (undated) DEVICE — Device: Brand: DEFENDO AIR/WATER/SUCTION AND BIOPSY VALVE

## (undated) DEVICE — Device: Brand: INTELLICART™

## (undated) DEVICE — MAXCESS C MODULE

## (undated) DEVICE — COVER,MAYO STAND,STERILE: Brand: MEDLINE

## (undated) DEVICE — BULB SYRINGE,IRRIGATION WITH PROTECTIVE CAP: Brand: DOVER

## (undated) DEVICE — TUBING CYSTO

## (undated) DEVICE — MEGADYNE E-Z CLEAN BLADE 2.75"

## (undated) DEVICE — CHLORAPREP 26ML APPLICATOR

## (undated) DEVICE — PIN DSTRCT 14MM

## (undated) NOTE — LETTER
04/12/18          To Whom It May concern:    Betsy Oden has been under my care and had surgery on 3/15/18. She recently had a fall which required a overnight hospitalization and 18 stitches to her forehead.   She has also noted increased vaginal

## (undated) NOTE — ED AVS SNAPSHOT
Santos Mccord   MRN: [id number]    Department:  THE [facility] Emergency Department in [place]   Date of Visit:  12/31/2019           Disclosure     Insurance plans vary and the physician(s) referred by the ER may not be covered by your plan.  Please conta tell this physician (or your personal doctor if your instructions are to return to your personal doctor) about any new or lasting problems. The primary care or specialist physician will see patients referred from the [facility] Emergency Department.  Salvadore Skiff

## (undated) NOTE — LETTER
[facility]  [address] [place] [address]  [place]ta   Office: [phone]  Fax: [phone]  [email or web address]      Date:  3/30/2018     Patient:  Nish Westfall         To Whom It May Concern:     The above names patient is u

## (undated) NOTE — LETTER
Patient Name: Ayah Pederson CSN: [id number]  -Age / Sex: 1955-A: 74 y  female Medical Records: [id number]    ABNORMAL VALUES  Surgeon(s):  Joaquina Leone MD  Anesthesia Type: General  Procedure Description: Anterior Cervical Discectomy Fusion Cervical 3 - Cervical 4  Primary Surgeon:  Joaquina Leone MD  Phone Number: [phone]    PLEASE NOTE THE FOLLOWING ABNORMALITIES:       PTT FROM 22 DRAW IS 34.8   WILL REPEAT ON ARRIVAL  ________________________________________________________

## (undated) NOTE — LETTER
To: Dr. Shakira Cardoso      Date:  2021  Fax #: [phone]    Patient Name: Radha STAFFORD-Age / Sex: 1955-A: 74 y  female  Phone:  [phone]   CSN: [id number]         Medical Records: [id number]    The above patient had a positive C

## (undated) NOTE — LETTER
OUTSIDE TESTING RESULT REQUEST     IMPORTANT: FOR YOUR IMMEDIATE ATTENTION    Please FAX all test results listed below to: [phone]         * * * * PATIENT HAS AN APPOINTMENT ON 22 * * * *      Patient Name: Devan Solorzano  Surgery Date: 2022  CSN: [id number]  Medical Record: [id number]   : 1955 - A: 74 y      Sex: female  Surgeon(s):  Jose Clayton MD  Procedure: Anterior Cervical Discectomy Fusion Cervical 3 - Cervical 4  Anesthesia Type: General     Surgeon: Jose Clayton MD     The following Testing and Time Line are REQUIRED PER ANESTHESIA     EKG READ AND SIGNED WITHIN   90 days      Thank Shani Foster by:Immanuel ALVARENGA RN      5/13/15

## (undated) NOTE — Clinical Note
Salena Velez with [facility]. Will follow symptoms and work out an office mgmt plan. I appreciate the opportunity to participate in her care.  Thank you, Domo Henderson

## (undated) NOTE — LETTER
Consent to Procedure/Sedation    Date: __2/21/2018_____    Time: ___8:05 AM ___    1. I authorize the performance upon Nancy Rojas the following:  [employer] (UDS)      2.  Bo Crane(and whomever is designated as the [employer] ___________________________    ___________________    Witness: ____________________     Date: ______________    Printed: 2018   8:05 AM    Patient Name: Zohrafrances Tammy        : 1955       Medical Record #: [id number]

## (undated) NOTE — LETTER
To: Dr. Ayush Mederos   Date:  1/18/2022          Patient Name: Costa Solorio / Sex: 5/14/1955-A: 74 y  female      CSN: [id number]      Medical Records: [id number]    The above patient had a positive COVID test on: 12/20/21      Per [facility] Infection Control guidelines this patient will NOT be retested for COVID  prior to their surgery/procedure on: 2/16/22. Thank you.     PAT 6/23/16 LONA